# Patient Record
Sex: FEMALE | Race: OTHER | HISPANIC OR LATINO | URBAN - METROPOLITAN AREA
[De-identification: names, ages, dates, MRNs, and addresses within clinical notes are randomized per-mention and may not be internally consistent; named-entity substitution may affect disease eponyms.]

---

## 2021-04-17 ENCOUNTER — INPATIENT (INPATIENT)
Facility: HOSPITAL | Age: 36
LOS: 1 days | Discharge: HOME | DRG: 690 | End: 2021-04-19
Attending: INTERNAL MEDICINE | Admitting: INTERNAL MEDICINE
Payer: MEDICAID

## 2021-04-17 VITALS
HEIGHT: 65 IN | OXYGEN SATURATION: 97 % | DIASTOLIC BLOOD PRESSURE: 66 MMHG | TEMPERATURE: 98 F | SYSTOLIC BLOOD PRESSURE: 130 MMHG | RESPIRATION RATE: 18 BRPM | WEIGHT: 147.05 LBS | HEART RATE: 64 BPM

## 2021-04-17 DIAGNOSIS — Z98.891 HISTORY OF UTERINE SCAR FROM PREVIOUS SURGERY: Chronic | ICD-10-CM

## 2021-04-17 DIAGNOSIS — N12 TUBULO-INTERSTITIAL NEPHRITIS, NOT SPECIFIED AS ACUTE OR CHRONIC: ICD-10-CM

## 2021-04-17 DIAGNOSIS — Z98.890 OTHER SPECIFIED POSTPROCEDURAL STATES: Chronic | ICD-10-CM

## 2021-04-17 LAB
ALBUMIN SERPL ELPH-MCNC: 4.1 G/DL — SIGNIFICANT CHANGE UP (ref 3.5–5)
ALP SERPL-CCNC: 53 U/L — SIGNIFICANT CHANGE UP (ref 40–120)
ALT FLD-CCNC: 19 U/L DA — SIGNIFICANT CHANGE UP (ref 10–60)
ANION GAP SERPL CALC-SCNC: 7 MMOL/L — SIGNIFICANT CHANGE UP (ref 5–17)
APPEARANCE UR: ABNORMAL
AST SERPL-CCNC: 15 U/L — SIGNIFICANT CHANGE UP (ref 10–40)
BACTERIA # UR AUTO: ABNORMAL /HPF
BASOPHILS # BLD AUTO: 0.04 K/UL — SIGNIFICANT CHANGE UP (ref 0–0.2)
BASOPHILS NFR BLD AUTO: 0.4 % — SIGNIFICANT CHANGE UP (ref 0–2)
BILIRUB SERPL-MCNC: 2.6 MG/DL — HIGH (ref 0.2–1.2)
BILIRUB UR-MCNC: NEGATIVE — SIGNIFICANT CHANGE UP
BLD GP AB SCN SERPL QL: SIGNIFICANT CHANGE UP
BUN SERPL-MCNC: 12 MG/DL — SIGNIFICANT CHANGE UP (ref 7–18)
CALCIUM SERPL-MCNC: 8.7 MG/DL — SIGNIFICANT CHANGE UP (ref 8.4–10.5)
CHLORIDE SERPL-SCNC: 108 MMOL/L — SIGNIFICANT CHANGE UP (ref 96–108)
CO2 SERPL-SCNC: 24 MMOL/L — SIGNIFICANT CHANGE UP (ref 22–31)
COLOR SPEC: YELLOW — SIGNIFICANT CHANGE UP
CREAT SERPL-MCNC: 0.55 MG/DL — SIGNIFICANT CHANGE UP (ref 0.5–1.3)
DIFF PNL FLD: ABNORMAL
EOSINOPHIL # BLD AUTO: 0.09 K/UL — SIGNIFICANT CHANGE UP (ref 0–0.5)
EOSINOPHIL NFR BLD AUTO: 0.8 % — SIGNIFICANT CHANGE UP (ref 0–6)
EPI CELLS # UR: SIGNIFICANT CHANGE UP /HPF
GLUCOSE SERPL-MCNC: 78 MG/DL — SIGNIFICANT CHANGE UP (ref 70–99)
GLUCOSE UR QL: NEGATIVE — SIGNIFICANT CHANGE UP
HCG SERPL-ACNC: <1 MIU/ML — SIGNIFICANT CHANGE UP
HCT VFR BLD CALC: 33.7 % — LOW (ref 34.5–45)
HGB BLD-MCNC: 11.6 G/DL — SIGNIFICANT CHANGE UP (ref 11.5–15.5)
IMM GRANULOCYTES NFR BLD AUTO: 0.4 % — SIGNIFICANT CHANGE UP (ref 0–1.5)
KETONES UR-MCNC: NEGATIVE — SIGNIFICANT CHANGE UP
LACTATE SERPL-SCNC: 1.9 MMOL/L — SIGNIFICANT CHANGE UP (ref 0.7–2)
LEUKOCYTE ESTERASE UR-ACNC: ABNORMAL
LIDOCAIN IGE QN: 176 U/L — SIGNIFICANT CHANGE UP (ref 73–393)
LYMPHOCYTES # BLD AUTO: 1.57 K/UL — SIGNIFICANT CHANGE UP (ref 1–3.3)
LYMPHOCYTES # BLD AUTO: 14.6 % — SIGNIFICANT CHANGE UP (ref 13–44)
MCHC RBC-ENTMCNC: 31.4 PG — SIGNIFICANT CHANGE UP (ref 27–34)
MCHC RBC-ENTMCNC: 34.4 GM/DL — SIGNIFICANT CHANGE UP (ref 32–36)
MCV RBC AUTO: 91.3 FL — SIGNIFICANT CHANGE UP (ref 80–100)
MONOCYTES # BLD AUTO: 0.92 K/UL — HIGH (ref 0–0.9)
MONOCYTES NFR BLD AUTO: 8.6 % — SIGNIFICANT CHANGE UP (ref 2–14)
NEUTROPHILS # BLD AUTO: 8.07 K/UL — HIGH (ref 1.8–7.4)
NEUTROPHILS NFR BLD AUTO: 75.2 % — SIGNIFICANT CHANGE UP (ref 43–77)
NITRITE UR-MCNC: NEGATIVE — SIGNIFICANT CHANGE UP
NRBC # BLD: 0 /100 WBCS — SIGNIFICANT CHANGE UP (ref 0–0)
PH UR: 6 — SIGNIFICANT CHANGE UP (ref 5–8)
PLATELET # BLD AUTO: 312 K/UL — SIGNIFICANT CHANGE UP (ref 150–400)
POTASSIUM SERPL-MCNC: 4.3 MMOL/L — SIGNIFICANT CHANGE UP (ref 3.5–5.3)
POTASSIUM SERPL-SCNC: 4.3 MMOL/L — SIGNIFICANT CHANGE UP (ref 3.5–5.3)
PROT SERPL-MCNC: 8.1 G/DL — SIGNIFICANT CHANGE UP (ref 6–8.3)
PROT UR-MCNC: 15
RAPID RVP RESULT: SIGNIFICANT CHANGE UP
RBC # BLD: 3.69 M/UL — LOW (ref 3.8–5.2)
RBC # FLD: 16.1 % — HIGH (ref 10.3–14.5)
RBC CASTS # UR COMP ASSIST: ABNORMAL /HPF (ref 0–2)
SARS-COV-2 RNA SPEC QL NAA+PROBE: SIGNIFICANT CHANGE UP
SODIUM SERPL-SCNC: 139 MMOL/L — SIGNIFICANT CHANGE UP (ref 135–145)
SP GR SPEC: 1.02 — SIGNIFICANT CHANGE UP (ref 1.01–1.02)
UROBILINOGEN FLD QL: 1
WBC # BLD: 10.73 K/UL — HIGH (ref 3.8–10.5)
WBC # FLD AUTO: 10.73 K/UL — HIGH (ref 3.8–10.5)
WBC UR QL: ABNORMAL /HPF (ref 0–5)

## 2021-04-17 RX ORDER — KETOROLAC TROMETHAMINE 30 MG/ML
15 SYRINGE (ML) INJECTION ONCE
Refills: 0 | Status: DISCONTINUED | OUTPATIENT
Start: 2021-04-17 | End: 2021-04-17

## 2021-04-17 RX ORDER — MEROPENEM 1 G/30ML
1000 INJECTION INTRAVENOUS EVERY 8 HOURS
Refills: 0 | Status: DISCONTINUED | OUTPATIENT
Start: 2021-04-17 | End: 2021-04-18

## 2021-04-17 RX ORDER — IOHEXOL 300 MG/ML
30 INJECTION, SOLUTION INTRAVENOUS ONCE
Refills: 0 | Status: COMPLETED | OUTPATIENT
Start: 2021-04-17 | End: 2021-04-17

## 2021-04-17 RX ORDER — SODIUM CHLORIDE 9 MG/ML
1000 INJECTION INTRAMUSCULAR; INTRAVENOUS; SUBCUTANEOUS ONCE
Refills: 0 | Status: COMPLETED | OUTPATIENT
Start: 2021-04-17 | End: 2021-04-17

## 2021-04-17 RX ORDER — CEFTRIAXONE 500 MG/1
1000 INJECTION, POWDER, FOR SOLUTION INTRAMUSCULAR; INTRAVENOUS ONCE
Refills: 0 | Status: COMPLETED | OUTPATIENT
Start: 2021-04-17 | End: 2021-04-17

## 2021-04-17 RX ADMIN — IOHEXOL 30 MILLILITER(S): 300 INJECTION, SOLUTION INTRAVENOUS at 15:54

## 2021-04-17 RX ADMIN — CEFTRIAXONE 100 MILLIGRAM(S): 500 INJECTION, POWDER, FOR SOLUTION INTRAMUSCULAR; INTRAVENOUS at 20:55

## 2021-04-17 RX ADMIN — Medication 15 MILLIGRAM(S): at 20:47

## 2021-04-17 RX ADMIN — Medication 15 MILLIGRAM(S): at 21:02

## 2021-04-17 RX ADMIN — SODIUM CHLORIDE 1000 MILLILITER(S): 9 INJECTION INTRAMUSCULAR; INTRAVENOUS; SUBCUTANEOUS at 17:00

## 2021-04-17 RX ADMIN — SODIUM CHLORIDE 1000 MILLILITER(S): 9 INJECTION INTRAMUSCULAR; INTRAVENOUS; SUBCUTANEOUS at 15:54

## 2021-04-17 NOTE — ED PROVIDER NOTE - ATTENDING CONTRIBUTION TO CARE
Patient presenting with flank pain  was diagnosed with uti and had outpatient iv abx x 3 days  + CVAT b/l  mild left sided abd discomfort  will obtain lab, ct, assess for pyelo, ed obs and reassess

## 2021-04-17 NOTE — H&P ADULT - ASSESSMENT
37yo F with PSH of sleeve gastrecomy with 9 revisions and no PMH presents with the complain of severe back pain for the past week. Admitted for pyelonephritis

## 2021-04-17 NOTE — H&P ADULT - PROBLEM SELECTOR PLAN 1
-Pt coming in with back pain with left CVA tenderness  -WBC >10K, no fever or tachycardia  -CT Abdomen/Pelvis with PO and IV contrast identified a right interpole cyst. Left-sided striated nephrogram and circumferential wall thickening.  -SIRS Criteria= 0 + likely source of infection (UTI)  -qSOFA= 1 Not High Risk  -UA is mildly positive  -Will continue with IVF hydration  -Tylenol PRN for fever  -Will start on Rocephin  -Follow urine and blood cultures though pt has been on abx out patient

## 2021-04-17 NOTE — ED PROVIDER NOTE - PROGRESS NOTE DETAILS
Patient complaining of mild discomfort, toradol ordered. Results discussed with patient.  Patient offered admission due to pyelonephritis in the setting of failure of outpatient abx and is in agreement to admission.  Dr. White accepting of admission.

## 2021-04-17 NOTE — H&P ADULT - HISTORY OF PRESENT ILLNESS
35yo F with PSH of sleeve gastrecomy with 9 revisions and no PMH presents with the complain of severe back pain for the past week. Pt ststes she went to her doctor for the back pain 3 days ago and was told she had a kidney infection and given 1 dose of an IV abx and sent home on oral abx (cefdinir). Pt took 2 of these tablets however her pain did not get any better. It is located in her left flank and radiates into her entire back, 9/10 in intensity and associated with chills. She endorses feeling fatigued, having dark urine but denies dysuria, burning micturition, incontinence, nausea, vomiting or diarrhea.     Ed course: CT abd: Urinary bladder wall thickening and a left-sided striated nephrogram, suspicious for cystitis-pyelonephritis. S/p ceftriaxone

## 2021-04-17 NOTE — H&P ADULT - PROBLEM SELECTOR PLAN 2
IMPROVE VTE Individual Risk Assessment  RISK                                                                Points  [  ] Previous VTE                                                  3  [  ] Thrombophilia                                               2  [  ] Lower limb paralysis                                      2        (unable to hold up >15 seconds)    [  ] Current Cancer                                              2         (within 6 months)  [  ] Immobilization > 24 hrs                                1  [  ] ICU/CCU stay > 24 hours                              1  [  ] Age > 60                                                      1  IMPROVE VTE Score 0, -- for DVT proph    No DVT ppx required

## 2021-04-17 NOTE — H&P ADULT - NSICDXFAMILYHX_GEN_ALL_CORE_FT
FAMILY HISTORY:  Father  Still living? Unknown  FH: diabetes mellitus, Age at diagnosis: Age Unknown  FH: hypertension, Age at diagnosis: Age Unknown    Mother  Still living? Unknown  FH: diabetes mellitus, Age at diagnosis: Age Unknown  FH: hypertension, Age at diagnosis: Age Unknown

## 2021-04-17 NOTE — ED ADULT NURSE NOTE - NSIMPLEMENTINTERV_GEN_ALL_ED
Implemented All Universal Safety Interventions:  Phil Campbell to call system. Call bell, personal items and telephone within reach. Instruct patient to call for assistance. Room bathroom lighting operational. Non-slip footwear when patient is off stretcher. Physically safe environment: no spills, clutter or unnecessary equipment. Stretcher in lowest position, wheels locked, appropriate side rails in place.

## 2021-04-17 NOTE — ED PROVIDER NOTE - CLINICAL SUMMARY MEDICAL DECISION MAKING FREE TEXT BOX
36 year old female history of gastric sleeve (2018, 9 revisions per patient), anemia, c-sections, abdominoplasty presents for subjective chills, fevers, malaise, b/l flank pain and dizziness x1 week in setting of ? failure of outpatient antibiotics for pyelonephritis.  Will check labs, urine, CTAP, IV hydration.

## 2021-04-17 NOTE — ED PROVIDER NOTE - OBJECTIVE STATEMENT
36 year old female history of gastric sleeve (2018, 9 revisions per patient), anemia, c-sections, abdominoplasty presents for subjective chills, fevers, malaise, b/l flank pain and dizziness x1 week.  Patient reports that she was evaluated for symptoms 3 days ago, was provided meclizine, cefdinir, IBU, however symptoms persist so patient went to urgent care today who referred patient to ED for further workup.  Patient also reports that she has been going to an outpatient center and receiving an unknown IV antibiotics for a "kidney infection" in addition to oral cefdinir, but symptoms persist.  Patient also endorsing left sided abd pain.  Patient states she often feels a room spinning sensation when walking.  Patient denies any nausea or vomiting and denies any measured fevers. 36 year old female history of gastric (2018, 9 revisions per patient), anemia, c-sections, abdominoplasty presents for subjective chills, fevers, malaise, b/l flank pain and dizziness x1 week.  Patient reports that she was evaluated for symptoms 3 days ago, was provided meclizine, cefdinir, IBU, however symptoms persist so patient went to urgent care today who referred patient to ED for further workup.  Patient also reports that she has been going to an outpatient center and receiving an unknown IV antibiotics for a "kidney infection" in addition to oral cefdinir, but symptoms persist.  Patient also endorsing left sided abd pain.  Patient states she often feels a room spinning sensation when walking.  Patient denies any nausea or vomiting and denies any measured fevers.

## 2021-04-17 NOTE — H&P ADULT - NSHPPHYSICALEXAM_GEN_ALL_CORE
LOS: 1d    VITALS:   T(C): 36.7 (04-17-21 @ 23:49), Max: 37 (04-17-21 @ 20:43)  HR: 81 (04-17-21 @ 23:49) (64 - 81)  BP: 106/57 (04-17-21 @ 23:49) (106/57 - 130/66)  RR: 18 (04-17-21 @ 23:49) (18 - 18)  SpO2: 100% (04-17-21 @ 23:49) (97% - 100%)    GENERAL: NAD, lying in bed comfortably  HEAD:  Atraumatic, Normocephalic  EYES: EOMI, PERRLA, conjunctiva and sclera clear  ENT: Moist mucous membranes  NECK: Supple, No JVD  CHEST/LUNG: Clear to auscultation bilaterally; No rales, rhonchi, wheezing, or rubs. Unlabored respirations  HEART: Regular rate and rhythm; No murmurs, rubs, or gallops  ABDOMEN: Left CVA tenderness  EXTREMITIES:  2+ Peripheral Pulses, brisk capillary refill. No clubbing, cyanosis, or edema  NERVOUS SYSTEM:  A&Ox3, no focal deficits   SKIN: No rashes or lesions

## 2021-04-17 NOTE — H&P ADULT - NSHPSOCIALHISTORY_GEN_ALL_CORE
Lives at home with children and mother. Drinks socially and does not smoke cigarettes or use recreational drugs

## 2021-04-17 NOTE — ED ADULT NURSE NOTE - IN THE PAST 12 MONTHS HAVE YOU USED DRUGS OTHER THAN THOSE REQUIRED FOR MEDICAL REASON?
No Topical Retinoid counseling:  Patient advised to apply a pea-sized amount only at bedtime and wait 30 minutes after washing their face before applying.  If too drying, patient may add a non-comedogenic moisturizer. The patient verbalized understanding of the proper use and possible adverse effects of retinoids.  All of the patient's questions and concerns were addressed.

## 2021-04-17 NOTE — H&P ADULT - NSHPREVIEWOFSYSTEMS_GEN_ALL_CORE
REVIEW OF SYSTEMS:    CONSTITUTIONAL: chills. no fevers   EYES/ENT: vertigo, No visual changes, throat pain   NECK: No pain or stiffness  RESPIRATORY: No cough, wheezing, hemoptysis; No shortness of breath  CARDIOVASCULAR: No chest pain or palpitations  GASTROINTESTINAL: No abdominal or epigastric pain. No nausea, vomiting, or hematemesis; No diarrhea or constipation. No melena or hematochezia.  GENITOURINARY: No dysuria, frequency or hematuria  NEUROLOGICAL: No numbness or weakness  SKIN: No itching, rashes

## 2021-04-17 NOTE — H&P ADULT - ATTENDING COMMENTS
Patient is a 36 year old female with a PMH of Gastric Sleeve in 2018 (s/p 9 revisions per patient), Chronic Anemia who was BIBEMS due to flank pain.  ( ID: [id number])  Patient states that beginning approximately 3 days prior to current presentation, she began to experience left flank pain for which she went to an outpatient PCP who administered an IV antibiotic (the name of which she does not know) and was sent home with Cefdinir 300mg PO (of which patient endorses taking only 2 pills).  However, patient was then instructed to follow-up at another clinic for "imaging", the results of which she does not know, though she was informed that she would need to go to the ED for further evaluation.    At time of examination in the ED, patient endorses right flank pain (not left).  However, patient denies any headache, dizziness, chest pain, palpitations, shortness of breath, nausea/vomiting/diarrhea.    T(C): 37 (04-17-21 @ 20:43), Max: 37 (04-17-21 @ 20:43)  T(F): 98.6 (04-17-21 @ 20:43), Max: 98.6 (04-17-21 @ 20:43)  HR: 67 (04-17-21 @ 20:43) (64 - 67)  BP: 108/62 (04-17-21 @ 20:43) (108/62 - 130/66)  RR: 18 (04-17-21 @ 20:43) (18 - 18)  SpO2: 100% (04-17-21 @ 20:43) (97% - 100%)  Wt(kg): --    P/E: As above MAR    A/P:    Acute Complicated UTI:  -CT Abdomen/Pelvis with PO and IV contrast identified a right interpole cyst. Left-sided striated nephrogram and circumferential wall thickening.  -SIRS Criteria= 0 + viktor source of infection (UTI)  -UA is grossly negative though patient has been treated with IV and PO antibiotics over the past several days  -Will send ESR, CRP, Procalcitonin  -Will continue with IVF hydration  -Tylenol PRN for fever  -Will continue patient on Rocephin, for now  -Patient may need to be seen by Urology, though will hold for now    Hyperbilirubinemia:  -Will send Bilirubin Total, Direct and Indirect  -If no demonstrated improvement, can consider obtaining Ultrasound of RUQ    GI/DVT PPx:  -Heparin  -Pepcid Patient is a 36 year old female with a PMH of Gastric Sleeve in 2018 (s/p 9 revisions per patient), Chronic Anemia who was BIBEMS due to flank pain.  ( ID: [id number])  Patient states that beginning approximately 3 days prior to current presentation, she began to experience left flank pain for which she went to an outpatient PCP who administered an IV antibiotic (the name of which she does not know) and was sent home with Cefdinir 300mg PO (of which patient endorses taking only 2 pills).  However, patient was then instructed to follow-up at another clinic for "imaging", the results of which she does not know, though she was informed that she would need to go to the ED for further evaluation.    At time of examination in the ED, patient endorses right flank pain (not left).  However, patient denies any headache, dizziness, chest pain, palpitations, shortness of breath, nausea/vomiting/diarrhea.    T(C): 37 (04-17-21 @ 20:43), Max: 37 (04-17-21 @ 20:43)  T(F): 98.6 (04-17-21 @ 20:43), Max: 98.6 (04-17-21 @ 20:43)  HR: 67 (04-17-21 @ 20:43) (64 - 67)  BP: 108/62 (04-17-21 @ 20:43) (108/62 - 130/66)  RR: 18 (04-17-21 @ 20:43) (18 - 18)  SpO2: 100% (04-17-21 @ 20:43) (97% - 100%)  Wt(kg): --    P/E: As above MAR    A/P:    Acute Complicated UTI:  -CT Abdomen/Pelvis with PO and IV contrast identified a right interpole cyst. Left-sided striated nephrogram and circumferential wall thickening.  -SIRS Criteria= 0 + likely source of infection (UTI)  -UA is grossly negative though patient has been treated with IV and PO antibiotics over the past several days  -Will send ESR, CRP, Procalcitonin  -Will continue with IVF hydration  -Tylenol PRN for fever  -Will continue patient on Rocephin, for now  -Patient may need to be seen by Urology, though will hold for now    Hyperbilirubinemia:  -Will send Bilirubin Total, Direct and Indirect  -If no demonstrated improvement, can consider obtaining Ultrasound of RUQ    GI/DVT PPx:  -Heparin  -Pepcid

## 2021-04-18 DIAGNOSIS — I95.9 HYPOTENSION, UNSPECIFIED: ICD-10-CM

## 2021-04-18 DIAGNOSIS — Z29.9 ENCOUNTER FOR PROPHYLACTIC MEASURES, UNSPECIFIED: ICD-10-CM

## 2021-04-18 DIAGNOSIS — N12 TUBULO-INTERSTITIAL NEPHRITIS, NOT SPECIFIED AS ACUTE OR CHRONIC: ICD-10-CM

## 2021-04-18 LAB
24R-OH-CALCIDIOL SERPL-MCNC: 14.5 NG/ML — LOW (ref 30–80)
A1C WITH ESTIMATED AVERAGE GLUCOSE RESULT: 4 % — SIGNIFICANT CHANGE UP (ref 4–5.6)
ANION GAP SERPL CALC-SCNC: 6 MMOL/L — SIGNIFICANT CHANGE UP (ref 5–17)
BUN SERPL-MCNC: 13 MG/DL — SIGNIFICANT CHANGE UP (ref 7–18)
CALCIUM SERPL-MCNC: 8 MG/DL — LOW (ref 8.4–10.5)
CHLORIDE SERPL-SCNC: 109 MMOL/L — HIGH (ref 96–108)
CHOLEST SERPL-MCNC: 113 MG/DL — SIGNIFICANT CHANGE UP
CO2 SERPL-SCNC: 25 MMOL/L — SIGNIFICANT CHANGE UP (ref 22–31)
COVID-19 SPIKE DOMAIN AB INTERP: POSITIVE
COVID-19 SPIKE DOMAIN ANTIBODY RESULT: >250 U/ML — HIGH
CREAT SERPL-MCNC: 0.37 MG/DL — LOW (ref 0.5–1.3)
CRP SERPL-MCNC: 32 MG/L — HIGH
CULTURE RESULTS: NO GROWTH — SIGNIFICANT CHANGE UP
ERYTHROCYTE [SEDIMENTATION RATE] IN BLOOD: 34 MM/HR — HIGH (ref 0–15)
ESTIMATED AVERAGE GLUCOSE: 68 MG/DL — SIGNIFICANT CHANGE UP (ref 68–114)
FOLATE SERPL-MCNC: 10 NG/ML — SIGNIFICANT CHANGE UP
GLUCOSE SERPL-MCNC: 81 MG/DL — SIGNIFICANT CHANGE UP (ref 70–99)
HCT VFR BLD CALC: 28.5 % — LOW (ref 34.5–45)
HDLC SERPL-MCNC: 39 MG/DL — LOW
HGB BLD-MCNC: 10 G/DL — LOW (ref 11.5–15.5)
LIPID PNL WITH DIRECT LDL SERPL: 55 MG/DL — SIGNIFICANT CHANGE UP
MAGNESIUM SERPL-MCNC: 2.3 MG/DL — SIGNIFICANT CHANGE UP (ref 1.6–2.6)
MCHC RBC-ENTMCNC: 31.6 PG — SIGNIFICANT CHANGE UP (ref 27–34)
MCHC RBC-ENTMCNC: 35.1 GM/DL — SIGNIFICANT CHANGE UP (ref 32–36)
MCV RBC AUTO: 90.2 FL — SIGNIFICANT CHANGE UP (ref 80–100)
NON HDL CHOLESTEROL: 74 MG/DL — SIGNIFICANT CHANGE UP
NRBC # BLD: 0 /100 WBCS — SIGNIFICANT CHANGE UP (ref 0–0)
PHOSPHATE SERPL-MCNC: 4 MG/DL — SIGNIFICANT CHANGE UP (ref 2.5–4.5)
PLATELET # BLD AUTO: 275 K/UL — SIGNIFICANT CHANGE UP (ref 150–400)
POTASSIUM SERPL-MCNC: 3.9 MMOL/L — SIGNIFICANT CHANGE UP (ref 3.5–5.3)
POTASSIUM SERPL-SCNC: 3.9 MMOL/L — SIGNIFICANT CHANGE UP (ref 3.5–5.3)
RBC # BLD: 3.16 M/UL — LOW (ref 3.8–5.2)
RBC # FLD: 16.3 % — HIGH (ref 10.3–14.5)
SARS-COV-2 IGG+IGM SERPL QL IA: >250 U/ML — HIGH
SARS-COV-2 IGG+IGM SERPL QL IA: POSITIVE
SODIUM SERPL-SCNC: 140 MMOL/L — SIGNIFICANT CHANGE UP (ref 135–145)
SPECIMEN SOURCE: SIGNIFICANT CHANGE UP
TRIGL SERPL-MCNC: 95 MG/DL — SIGNIFICANT CHANGE UP
VIT B12 SERPL-MCNC: 320 PG/ML — SIGNIFICANT CHANGE UP (ref 232–1245)
WBC # BLD: 6.86 K/UL — SIGNIFICANT CHANGE UP (ref 3.8–10.5)
WBC # FLD AUTO: 6.86 K/UL — SIGNIFICANT CHANGE UP (ref 3.8–10.5)

## 2021-04-18 RX ORDER — SODIUM CHLORIDE 9 MG/ML
1000 INJECTION INTRAMUSCULAR; INTRAVENOUS; SUBCUTANEOUS
Refills: 0 | Status: DISCONTINUED | OUTPATIENT
Start: 2021-04-18 | End: 2021-04-19

## 2021-04-18 RX ORDER — ACETAMINOPHEN 500 MG
650 TABLET ORAL EVERY 6 HOURS
Refills: 0 | Status: DISCONTINUED | OUTPATIENT
Start: 2021-04-18 | End: 2021-04-19

## 2021-04-18 RX ORDER — SODIUM CHLORIDE 9 MG/ML
1000 INJECTION INTRAMUSCULAR; INTRAVENOUS; SUBCUTANEOUS
Refills: 0 | Status: DISCONTINUED | OUTPATIENT
Start: 2021-04-18 | End: 2021-04-18

## 2021-04-18 RX ORDER — CEFTRIAXONE 500 MG/1
1000 INJECTION, POWDER, FOR SOLUTION INTRAMUSCULAR; INTRAVENOUS EVERY 24 HOURS
Refills: 0 | Status: DISCONTINUED | OUTPATIENT
Start: 2021-04-18 | End: 2021-04-19

## 2021-04-18 RX ADMIN — CEFTRIAXONE 100 MILLIGRAM(S): 500 INJECTION, POWDER, FOR SOLUTION INTRAMUSCULAR; INTRAVENOUS at 03:36

## 2021-04-18 RX ADMIN — SODIUM CHLORIDE 100 MILLILITER(S): 9 INJECTION INTRAMUSCULAR; INTRAVENOUS; SUBCUTANEOUS at 10:02

## 2021-04-18 RX ADMIN — Medication 650 MILLIGRAM(S): at 15:59

## 2021-04-18 RX ADMIN — Medication 650 MILLIGRAM(S): at 16:30

## 2021-04-18 RX ADMIN — SODIUM CHLORIDE 70 MILLILITER(S): 9 INJECTION INTRAMUSCULAR; INTRAVENOUS; SUBCUTANEOUS at 03:37

## 2021-04-18 NOTE — DISCHARGE NOTE PROVIDER - NSDCCPCAREPLAN_GEN_ALL_CORE_FT
PRINCIPAL DISCHARGE DIAGNOSIS  Diagnosis: Pyelonephritis  Assessment and Plan of Treatment: You came to the hospital with  back pain. On examination, you had left  CVA tenderness. You had no fevers. Your white count was elevated. Your CT Abdomen/Pelvis showed a right interpole cyst and left-sided striated nephrogram and circumferential wall thickening indicating a kidney infection (pyelonephritis)/ You were treated with intravenous antibiotics Rocephin. You will be discharged with **** antibiotics which you must take for *** days. You were also treated with intravenous fluids for low blood pressures and Tylenol for fever. Kindly follow up with your PCP in two weeks after discharge       PRINCIPAL DISCHARGE DIAGNOSIS  Diagnosis: Pyelonephritis  Assessment and Plan of Treatment: You came to the hospital with  back pain. On examination, you had left  CVA tenderness. You had no fevers. Your white count was elevated. Your CT Abdomen/Pelvis showed a right interpole cyst and left-sided striated nephrogram and circumferential wall thickening indicating a kidney infection (pyelonephritis)/ You were treated with intravenous antibiotics Rocephin. You will be discharged with ceftin antibiotics which you must take for 5 days. You were also treated with intravenous fluids for low blood pressures and Tylenol for fever. Kindly follow up with your PCP in two weeks after discharge      SECONDARY DISCHARGE DIAGNOSES  Diagnosis: Malpositioned IUD  Assessment and Plan of Treatment: On     PRINCIPAL DISCHARGE DIAGNOSIS  Diagnosis: Pyelonephritis  Assessment and Plan of Treatment: You came to the hospital with  back pain. On examination, you had left  CVA tenderness. You had no fevers. Your white count was elevated. Your CT Abdomen/Pelvis showed a right interpole cyst and left-sided striated nephrogram and circumferential wall thickening indicating a kidney infection (pyelonephritis)/ You were treated with intravenous antibiotics Rocephin. You will be discharged with ceftin antibiotics which you must take for 5 days. You were also treated with intravenous fluids for low blood pressures and Tylenol for fever. Kindly follow up with your PCP in two weeks after discharge      SECONDARY DISCHARGE DIAGNOSES  Diagnosis: Malpositioned IUD  Assessment and Plan of Treatment: On CT abdomen and pelvis showed malpositioned IUD, Gyn consulted and recommended o/p f/u.     PRINCIPAL DISCHARGE DIAGNOSIS  Diagnosis: Pyelonephritis  Assessment and Plan of Treatment: You came to the hospital with  back pain. On examination, you had left  CVA tenderness. You had no fevers. Your white count was elevated. Your CT Abdomen/Pelvis showed a right interpole cyst and left-sided striated nephrogram and circumferential wall thickening indicating a kidney infection (pyelonephritis)/ You were treated with intravenous antibiotics Rocephin. You will be discharged with ceftin antibiotics which you must take for 5 days. You were also treated with intravenous fluids for low blood pressures and Tylenol for fever. Kindly follow up with your PCP in two weeks after discharge      SECONDARY DISCHARGE DIAGNOSES  Diagnosis: Malpositioned IUD  Assessment and Plan of Treatment: On CT abdomen and pelvis showed malpositioned IUD, Gynecology consulted and recommended outpatient follow up with Dr. Karen Gómez within two weeks of discharge    Diagnosis: Vitamin D deficiency  Assessment and Plan of Treatment: You had low Vitamin D levels. We treated you with Vitamin D 1000U daily, Kindly continue the same on discharge     PRINCIPAL DISCHARGE DIAGNOSIS  Diagnosis: Pyelonephritis  Assessment and Plan of Treatment: You came to the hospital with  back pain. On examination, you had left  CVA tenderness. You had no fevers. Your white count was elevated. Your CT Abdomen/Pelvis showed a right interpole cyst and left-sided striated nephrogram and circumferential wall thickening indicating a kidney infection (pyelonephritis)/ You were treated with intravenous antibiotics Rocephin. You will be discharged with ciprofloxacin antibiotics which you must take for 7 days. You were also treated with intravenous fluids for low blood pressures and Tylenol for fever. Kindly follow up with your PCP in two weeks after discharge      SECONDARY DISCHARGE DIAGNOSES  Diagnosis: Malpositioned IUD  Assessment and Plan of Treatment: On CT abdomen and pelvis showed malpositioned IUD, Gynecology consulted and recommended outpatient follow up with Dr. Karen Gómez within two weeks of discharge    Diagnosis: Vitamin D deficiency  Assessment and Plan of Treatment: You had low Vitamin D levels. We treated you with Vitamin D 1000U daily, Kindly continue the same on discharge

## 2021-04-18 NOTE — PROGRESS NOTE ADULT - PROBLEM SELECTOR PLAN 2
IMPROVE VTE Individual Risk Assessment  RISK                                                                Points  [  ] Previous VTE                                                  3  [  ] Thrombophilia                                               2  [  ] Lower limb paralysis                                      2        (unable to hold up >15 seconds)    [  ] Current Cancer                                              2         (within 6 months)  [  ] Immobilization > 24 hrs                                1  [  ] ICU/CCU stay > 24 hours                              1  [  ] Age > 60                                                      1  IMPROVE VTE Score 0, -- for DVT proph    No DVT ppx required Pt has been having low blood pressures (likely pt's baseline)  on IVF  will monitor BPs

## 2021-04-18 NOTE — PROGRESS NOTE ADULT - SUBJECTIVE AND OBJECTIVE BOX
PGY-1 Progress Note discussed with attending    PAGER #: [[phone]] TILL 5:00 PM  PLEASE CONTACT ON CALL TEAM:  - On Call Team (Please refer to Denise) FROM 5:00 PM - 8:30PM  - Nightfloat Team FROM 8:30 -7:30 AM    INTERVAL HPI/OVERNIGHT EVENTS:   Pt afebrile last night. Had no complaints this morning.     REVIEW OF SYSTEMS:  CONSTITUTIONAL: No fever, weight loss, or fatigue  RESPIRATORY: No cough, wheezing, chills or hemoptysis; No shortness of breath  CARDIOVASCULAR: No chest pain, palpitations, dizziness, or leg swelling  GASTROINTESTINAL: No abdominal pain. No nausea, vomiting, or hematemesis; No diarrhea or constipation. No melena or hematochezia.  GENITOURINARY: No dysuria or hematuria, urinary frequency  NEUROLOGICAL: No headaches, memory loss, loss of strength, numbness, or tremors  SKIN: No itching, burning, rashes, or lesions     Vital Signs Last 24 Hrs  T(C): 36.7 (18 Apr 2021 05:12), Max: 37 (17 Apr 2021 20:43)  T(F): 98 (18 Apr 2021 05:12), Max: 98.6 (17 Apr 2021 20:43)  HR: 70 (18 Apr 2021 05:12) (64 - 81)  BP: 88/46 (18 Apr 2021 05:12) (88/46 - 130/66)  BP(mean): --  RR: 18 (18 Apr 2021 05:12) (18 - 18)  SpO2: 99% (18 Apr 2021 05:12) (97% - 100%)    PHYSICAL EXAMINATION:  GENERAL: NAD, well built  HEAD:  Atraumatic, Normocephalic  EYES:  conjunctiva and sclera clear  NECK: Supple, No JVD, Normal thyroid  CHEST/LUNG: Clear to auscultation. Clear to percussion bilaterally; No rales, rhonchi, wheezing, or rubs  HEART: Regular rate and rhythm; No murmurs, rubs, or gallops  ABDOMEN: Soft, Nontender, Nondistended; Bowel sounds present  NERVOUS SYSTEM:  Alert & Oriented X3,    EXTREMITIES:  2+ Peripheral Pulses, No clubbing, cyanosis, or edema  SKIN: warm dry                          10.0   6.86  )-----------( 275      ( 18 Apr 2021 06:45 )             28.5     04-18    140  |  109<H>  |  13  ----------------------------<  81  3.9   |  25  |  0.37<L>    Ca    8.0<L>      18 Apr 2021 06:45  Phos  4.0     04-18  Mg     2.3     04-18    TPro  8.1  /  Alb  4.1  /  TBili  2.6<H>  /  DBili  x   /  AST  15  /  ALT  19  /  AlkPhos  53  04-17    LIVER FUNCTIONS - ( 17 Apr 2021 16:06 )  Alb: 4.1 g/dL / Pro: 8.1 g/dL / ALK PHOS: 53 U/L / ALT: 19 U/L DA / AST: 15 U/L / GGT: x               CAPILLARY BLOOD GLUCOSE      RADIOLOGY & ADDITIONAL TESTS:       PGY-1 Progress Note discussed with attending    PAGER #: [[phone]] TILL 5:00 PM  PLEASE CONTACT ON CALL TEAM:  - On Call Team (Please refer to Denise) FROM 5:00 PM - 8:30PM  - Nightfloat Team FROM 8:30 -7:30 AM    INTERVAL HPI/OVERNIGHT EVENTS:   Pt afebrile last night. Had no complaints this morning. Blood pressures are soft, on IVF, will monitor BPs.    REVIEW OF SYSTEMS:  CONSTITUTIONAL: No fever, weight loss, or fatigue  RESPIRATORY: No cough, wheezing, chills or hemoptysis; No shortness of breath  CARDIOVASCULAR: No chest pain, palpitations, dizziness, or leg swelling  GASTROINTESTINAL: No abdominal pain. No nausea, vomiting, or hematemesis; No diarrhea or constipation. No melena or hematochezia.  GENITOURINARY: No dysuria or hematuria, urinary frequency  NEUROLOGICAL: No headaches, memory loss, loss of strength, numbness, or tremors  SKIN: No itching, burning, rashes, or lesions     Vital Signs Last 24 Hrs  T(C): 36.7 (18 Apr 2021 05:12), Max: 37 (17 Apr 2021 20:43)  T(F): 98 (18 Apr 2021 05:12), Max: 98.6 (17 Apr 2021 20:43)  HR: 70 (18 Apr 2021 05:12) (64 - 81)  BP: 88/46 (18 Apr 2021 05:12) (88/46 - 130/66)  BP(mean): --  RR: 18 (18 Apr 2021 05:12) (18 - 18)  SpO2: 99% (18 Apr 2021 05:12) (97% - 100%)    PHYSICAL EXAMINATION:  GENERAL: NAD, well built  HEAD:  Atraumatic, Normocephalic  EYES:  conjunctiva and sclera clear  NECK: Supple, No JVD, Normal thyroid  CHEST/LUNG: Clear to auscultation. Clear to percussion bilaterally; No rales, rhonchi, wheezing, or rubs  HEART: Regular rate and rhythm; No murmurs, rubs, or gallops  ABDOMEN: Soft, Nontender, Nondistended; Bowel sounds present  NERVOUS SYSTEM:  Alert & Oriented X3,    EXTREMITIES:  2+ Peripheral Pulses, No clubbing, cyanosis, or edema  SKIN: warm dry                          10.0   6.86  )-----------( 275      ( 18 Apr 2021 06:45 )             28.5     04-18    140  |  109<H>  |  13  ----------------------------<  81  3.9   |  25  |  0.37<L>    Ca    8.0<L>      18 Apr 2021 06:45  Phos  4.0     04-18  Mg     2.3     04-18    TPro  8.1  /  Alb  4.1  /  TBili  2.6<H>  /  DBili  x   /  AST  15  /  ALT  19  /  AlkPhos  53  04-17    LIVER FUNCTIONS - ( 17 Apr 2021 16:06 )  Alb: 4.1 g/dL / Pro: 8.1 g/dL / ALK PHOS: 53 U/L / ALT: 19 U/L DA / AST: 15 U/L / GGT: x               CAPILLARY BLOOD GLUCOSE      RADIOLOGY & ADDITIONAL TESTS:       PGY-1 Progress Note discussed with attending    PAGER #: [[phone]] TILL 5:00 PM  PLEASE CONTACT ON CALL TEAM:  - On Call Team (Please refer to Denise) FROM 5:00 PM - 8:30PM  - Nightfloat Team FROM 8:30 -7:30 AM    INTERVAL HPI/OVERNIGHT EVENTS:   Pt afebrile last night. Still has back pain in the left side this morning. Blood pressures are soft, on IVF, will monitor BPs.    REVIEW OF SYSTEMS:  CONSTITUTIONAL: No fever, weight loss, or fatigue  RESPIRATORY: No cough, wheezing, chills or hemoptysis; No shortness of breath  CARDIOVASCULAR: No chest pain, palpitations, dizziness, or leg swelling  GASTROINTESTINAL: No abdominal pain. No nausea, vomiting, or hematemesis; No diarrhea or constipation. No melena or hematochezia.  GENITOURINARY: No dysuria or hematuria, urinary frequency  NEUROLOGICAL: No headaches, memory loss, loss of strength, numbness, or tremors  SKIN: No itching, burning, rashes, or lesions     Vital Signs Last 24 Hrs  T(C): 36.7 (18 Apr 2021 05:12), Max: 37 (17 Apr 2021 20:43)  T(F): 98 (18 Apr 2021 05:12), Max: 98.6 (17 Apr 2021 20:43)  HR: 70 (18 Apr 2021 05:12) (64 - 81)  BP: 88/46 (18 Apr 2021 05:12) (88/46 - 130/66)  BP(mean): --  RR: 18 (18 Apr 2021 05:12) (18 - 18)  SpO2: 99% (18 Apr 2021 05:12) (97% - 100%)    PHYSICAL EXAMINATION:  GENERAL: NAD, well built  HEAD:  Atraumatic, Normocephalic  EYES:  conjunctiva and sclera clear  NECK: Supple, No JVD, Normal thyroid  CHEST/LUNG: Clear to auscultation. Clear to percussion bilaterally; No rales, rhonchi, wheezing, or rubs  HEART: Regular rate and rhythm; No murmurs, rubs, or gallops  ABDOMEN: Soft, Nontender, Nondistended; Bowel sounds present  NERVOUS SYSTEM:  Alert & Oriented X3,    EXTREMITIES:  2+ Peripheral Pulses, No clubbing, cyanosis, or edema  SKIN: warm dry                          10.0   6.86  )-----------( 275      ( 18 Apr 2021 06:45 )             28.5     04-18    140  |  109<H>  |  13  ----------------------------<  81  3.9   |  25  |  0.37<L>    Ca    8.0<L>      18 Apr 2021 06:45  Phos  4.0     04-18  Mg     2.3     04-18    TPro  8.1  /  Alb  4.1  /  TBili  2.6<H>  /  DBili  x   /  AST  15  /  ALT  19  /  AlkPhos  53  04-17    LIVER FUNCTIONS - ( 17 Apr 2021 16:06 )  Alb: 4.1 g/dL / Pro: 8.1 g/dL / ALK PHOS: 53 U/L / ALT: 19 U/L DA / AST: 15 U/L / GGT: x               CAPILLARY BLOOD GLUCOSE      RADIOLOGY & ADDITIONAL TESTS:

## 2021-04-18 NOTE — DISCHARGE NOTE PROVIDER - CARE PROVIDER_API CALL
Karen Gómez)  Obstetrics and Gynecology  [address] [place] [address]  Phone: [phone]  Fax: [phone]  Follow Up Time: 2 weeks

## 2021-04-18 NOTE — DISCHARGE NOTE PROVIDER - HOSPITAL COURSE
35yo F with PSH of sleeve gastrectomy with 9 revisions and no PMH presents with the complain of severe back pain for the past week. Admitted for pyelonephritis    Pt came to the hospital with back pain and left CVA tenderness. WBC >10K, no fever or tachycardia, CT Abdomen/Pelvis with PO and IV contrast identified a right interpole cyst. Left-sided striated nephrogram and circumferential wall thickening. SIRS Criteria= 0, qSOFA= 1 Not High Risk. UA is mildly positive. Pt diagnosed with pyelonephritis. Treated with IVF hydration, Tylenol PRN for fever, Rocephin.   Pt had low blood pressures, was given IVF, improved.    Patient is stable for discharge per attending and is advised to follow up with PCP as outpatient  Please refer to patient's complete medical chart with documents for a full hospital course, for this is only a brief summary.   35yo F with PSH of sleeve gastrectomy with 9 revisions and no PMH presents with the complain of severe back pain for the past week. Admitted for pyelonephritis    Pt came to the hospital with back pain and left CVA tenderness. WBC >10K, no fever or tachycardia, CT Abdomen/Pelvis with PO and IV contrast identified a right interpole cyst. Left-sided striated nephrogram and circumferential wall thickening. SIRS Criteria= 0, qSOFA= 1 Not High Risk. UA is mildly positive. Pt diagnosed with pyelonephritis. Treated with IVF hydration, Tylenol PRN for fever, Rocephin.   On CTAP showed malpositioned IUD, Gyn consulted and recommended o/p f/u.  Pt had low blood pressures, was given IVF, improved.    Patient is stable for discharge per attending and is advised to follow up with PCP as outpatient  Please refer to patient's complete medical chart with documents for a full hospital course, for this is only a brief summary.   37yo F with PSH of sleeve gastrectomy with 9 revisions and no PMH presents with the complain of severe back pain for the past week. Admitted for pyelonephritis    Pt came to the hospital with back pain and left CVA tenderness. WBC >10K, no fever or tachycardia, CT Abdomen/Pelvis with PO and IV contrast identified a right interpole cyst. Left-sided striated nephrogram and circumferential wall thickening. SIRS Criteria= 0, qSOFA= 1 Not High Risk. UA is mildly positive. Pt diagnosed with pyelonephritis. Treated with IVF hydration, Tylenol PRN for fever, Rocephin.   On CTAP showed malpositioned IUD, Gyn consulted and recommended o/p f/u.  Pt had low blood pressures, was given IVF, improved.  Vitamin D levels were 14.5, started on 1000U vitamin D.    Patient is stable for discharge per attending and is advised to follow up with PCP as outpatient  Please refer to patient's complete medical chart with documents for a full hospital course, for this is only a brief summary.   37yo F with PSH of sleeve gastrectomy with 9 revisions and no PMH presents with the complain of severe back pain for the past week. Admitted for pyelonephritis    Pt came to the hospital with back pain and left CVA tenderness. WBC >10K, no fever or tachycardia, CT Abdomen/Pelvis with PO and IV contrast identified a right interpole cyst. Left-sided striated nephrogram and circumferential wall thickening. SIRS Criteria= 0, qSOFA= 1 Not High Risk. UA is mildly positive. Pt diagnosed with pyelonephritis. Treated with IVF hydration, Tylenol PRN for fever, Rocephin, will dc on ciprofloxacin for 7 days  On CTAP showed malpositioned IUD, Gyn consulted and recommended o/p f/u.  Pt had low blood pressures, was given IVF, improved.  Vitamin D levels were 14.5, started on 1000U vitamin D.    Patient is stable for discharge per attending and is advised to follow up with PCP as outpatient  Please refer to patient's complete medical chart with documents for a full hospital course, for this is only a brief summary.

## 2021-04-18 NOTE — DISCHARGE NOTE PROVIDER - NSDCMRMEDTOKEN_GEN_ALL_CORE_FT
acetaminophen 325 mg oral tablet: 2 tab(s) orally prn, As Needed -Mild Pain (1 - 3) MDD:1300mg  amoxicillin-clavulanate 500 mg-125 mg oral tablet: 1 tab(s) orally 2 times a day   D 1000 intl units (25 mcg) oral tablet: 1 tab(s) orally once a day x 30 days    acetaminophen 325 mg oral tablet: 2 tab(s) orally prn, As Needed -Mild Pain (1 - 3) MDD:1300mg  Cipro 500 mg oral tablet: 1 tab(s) orally 2 times a day   D 1000 intl units (25 mcg) oral tablet: 1 tab(s) orally once a day x 30 days

## 2021-04-19 VITALS
HEART RATE: 80 BPM | TEMPERATURE: 98 F | SYSTOLIC BLOOD PRESSURE: 100 MMHG | OXYGEN SATURATION: 100 % | DIASTOLIC BLOOD PRESSURE: 65 MMHG | RESPIRATION RATE: 18 BRPM

## 2021-04-19 DIAGNOSIS — Z30.431 ENCOUNTER FOR ROUTINE CHECKING OF INTRAUTERINE CONTRACEPTIVE DEVICE: ICD-10-CM

## 2021-04-19 LAB
ALBUMIN SERPL ELPH-MCNC: 3 G/DL — LOW (ref 3.5–5)
ALP SERPL-CCNC: 42 U/L — SIGNIFICANT CHANGE UP (ref 40–120)
ALT FLD-CCNC: 17 U/L DA — SIGNIFICANT CHANGE UP (ref 10–60)
ANION GAP SERPL CALC-SCNC: 5 MMOL/L — SIGNIFICANT CHANGE UP (ref 5–17)
AST SERPL-CCNC: 10 U/L — SIGNIFICANT CHANGE UP (ref 10–40)
BASOPHILS # BLD AUTO: 0.04 K/UL — SIGNIFICANT CHANGE UP (ref 0–0.2)
BASOPHILS NFR BLD AUTO: 0.5 % — SIGNIFICANT CHANGE UP (ref 0–2)
BILIRUB SERPL-MCNC: 1.3 MG/DL — HIGH (ref 0.2–1.2)
BUN SERPL-MCNC: 10 MG/DL — SIGNIFICANT CHANGE UP (ref 7–18)
CALCIUM SERPL-MCNC: 8.2 MG/DL — LOW (ref 8.4–10.5)
CHLORIDE SERPL-SCNC: 109 MMOL/L — HIGH (ref 96–108)
CO2 SERPL-SCNC: 27 MMOL/L — SIGNIFICANT CHANGE UP (ref 22–31)
CREAT SERPL-MCNC: 0.38 MG/DL — LOW (ref 0.5–1.3)
EOSINOPHIL # BLD AUTO: 0.13 K/UL — SIGNIFICANT CHANGE UP (ref 0–0.5)
EOSINOPHIL NFR BLD AUTO: 1.8 % — SIGNIFICANT CHANGE UP (ref 0–6)
GLUCOSE SERPL-MCNC: 120 MG/DL — HIGH (ref 70–99)
HCT VFR BLD CALC: 28.9 % — LOW (ref 34.5–45)
HGB BLD-MCNC: 10 G/DL — LOW (ref 11.5–15.5)
IMM GRANULOCYTES NFR BLD AUTO: 0.4 % — SIGNIFICANT CHANGE UP (ref 0–1.5)
LYMPHOCYTES # BLD AUTO: 1.99 K/UL — SIGNIFICANT CHANGE UP (ref 1–3.3)
LYMPHOCYTES # BLD AUTO: 27.3 % — SIGNIFICANT CHANGE UP (ref 13–44)
MAGNESIUM SERPL-MCNC: 2.3 MG/DL — SIGNIFICANT CHANGE UP (ref 1.6–2.6)
MCHC RBC-ENTMCNC: 31.5 PG — SIGNIFICANT CHANGE UP (ref 27–34)
MCHC RBC-ENTMCNC: 34.6 GM/DL — SIGNIFICANT CHANGE UP (ref 32–36)
MCV RBC AUTO: 91.2 FL — SIGNIFICANT CHANGE UP (ref 80–100)
MONOCYTES # BLD AUTO: 0.64 K/UL — SIGNIFICANT CHANGE UP (ref 0–0.9)
MONOCYTES NFR BLD AUTO: 8.8 % — SIGNIFICANT CHANGE UP (ref 2–14)
NEUTROPHILS # BLD AUTO: 4.45 K/UL — SIGNIFICANT CHANGE UP (ref 1.8–7.4)
NEUTROPHILS NFR BLD AUTO: 61.2 % — SIGNIFICANT CHANGE UP (ref 43–77)
NRBC # BLD: 0 /100 WBCS — SIGNIFICANT CHANGE UP (ref 0–0)
PHOSPHATE SERPL-MCNC: 2.6 MG/DL — SIGNIFICANT CHANGE UP (ref 2.5–4.5)
PLATELET # BLD AUTO: 278 K/UL — SIGNIFICANT CHANGE UP (ref 150–400)
POTASSIUM SERPL-MCNC: 4.2 MMOL/L — SIGNIFICANT CHANGE UP (ref 3.5–5.3)
POTASSIUM SERPL-SCNC: 4.2 MMOL/L — SIGNIFICANT CHANGE UP (ref 3.5–5.3)
PROT SERPL-MCNC: 6.6 G/DL — SIGNIFICANT CHANGE UP (ref 6–8.3)
RBC # BLD: 3.17 M/UL — LOW (ref 3.8–5.2)
RBC # FLD: 16.5 % — HIGH (ref 10.3–14.5)
SODIUM SERPL-SCNC: 141 MMOL/L — SIGNIFICANT CHANGE UP (ref 135–145)
WBC # BLD: 7.28 K/UL — SIGNIFICANT CHANGE UP (ref 3.8–10.5)
WBC # FLD AUTO: 7.28 K/UL — SIGNIFICANT CHANGE UP (ref 3.8–10.5)

## 2021-04-19 RX ORDER — ACETAMINOPHEN 500 MG
2 TABLET ORAL
Qty: 20 | Refills: 0
Start: 2021-04-19 | End: 2021-04-23

## 2021-04-19 RX ORDER — CHOLECALCIFEROL (VITAMIN D3) 125 MCG
1 CAPSULE ORAL
Qty: 30 | Refills: 0
Start: 2021-04-19 | End: 2021-05-18

## 2021-04-19 RX ORDER — CHOLECALCIFEROL (VITAMIN D3) 125 MCG
1000 CAPSULE ORAL DAILY
Refills: 0 | Status: DISCONTINUED | OUTPATIENT
Start: 2021-04-19 | End: 2021-04-19

## 2021-04-19 RX ORDER — MOXIFLOXACIN HYDROCHLORIDE TABLETS, 400 MG 400 MG/1
1 TABLET, FILM COATED ORAL
Qty: 14 | Refills: 0
Start: 2021-04-19 | End: 2021-04-25

## 2021-04-19 RX ADMIN — CEFTRIAXONE 100 MILLIGRAM(S): 500 INJECTION, POWDER, FOR SOLUTION INTRAMUSCULAR; INTRAVENOUS at 05:46

## 2021-04-19 RX ADMIN — Medication 650 MILLIGRAM(S): at 09:30

## 2021-04-19 RX ADMIN — Medication 650 MILLIGRAM(S): at 08:47

## 2021-04-19 NOTE — PROGRESS NOTE ADULT - SUBJECTIVE AND OBJECTIVE BOX
MED Student 3- Note discussed with supervising resident and primary attending    Patient is a 36y old  Female who presents with a chief complaint of Back pain (2021 09:50)      INTERVAL HPI/OVERNIGHT EVENTS: no events noted overnight.   The patient were seen today, no new complaint. The frequency and burning with micturition is improving. The patient remain afebrile all night. Denies chills, nausea and vomiting.    The left flank pain radiates to the entire lower back minimally improved. The patient has a IUD that shown invading the myometrium on CT.     MEDICATIONS  (STANDING):  cefTRIAXone   IVPB 1000 milliGRAM(s) IV Intermittent every 24 hours  cholecalciferol 1000 Unit(s) Oral daily  sodium chloride 0.9%. 1000 milliLiter(s) (100 mL/Hr) IV Continuous <Continuous>    MEDICATIONS  (PRN):  acetaminophen   Tablet .. 650 milliGRAM(s) Oral every 6 hours PRN Mild Pain (1 - 3)      __________________________________________________  REVIEW OF SYSTEMS:  CONSTITUTIONAL: No fever   EYES: no acute visual disturbances  NECK: No pain or stiffness  RESPIRATORY: No cough; No shortness of breath  CARDIOVASCULAR: No chest pain, no palpitations  GASTROINTESTINAL: No pain. No nausea or vomiting; No diarrhea  BACK: Bilateral lower back pain, worse on the left flank.  NEUROLOGICAL: No headache or numbness, no tremors  MUSCULOSKELETAL: No joint pain, no muscle pain  GENITOURINARY: no dysuria, no frequency, no hesitancy  PSYCHIATRY: no depression , no anxiety  ALL OTHER ROS negative        Vital Signs Last 24 Hrs  T(C): 36.7 (2021 05:10), Max: 37.1 (2021 14:06)  T(F): 98 (2021 05:10), Max: 98.7 (2021 14:06)  HR: 65 (2021 05:10) (65 - 75)  BP: 93/58 (2021 05:10) (88/40 - 101/45)  BP(mean): --  RR: 18 (2021 05:10) (18 - 18)  SpO2: 100% (2021 05:10) (100% - 100%)    ________________________________________________  PHYSICAL EXAM:  GENERAL: NAD  HEENT: Normocephalic;  conjunctivae and sclerae clear; moist mucous membranes;   NECK : supple  CHEST/LUNG: Clear to auscultation bilaterally with good air entry   HEART: S1 S2  regular; no murmurs, gallops or rubs  ABDOMEN: Soft, Nontender, Nondistended; Bowel sounds present  BACK: +ve Left CVA tenderness  EXTREMITIES: no cyanosis; no edema; no calf tenderness  SKIN: warm and dry; no rash  NERVOUS SYSTEM:  Awake and alert; Oriented  to place, person and time ; no new deficits    _________________________________________________  LABS:                        10.0   7.28  )-----------( 278      ( 2021 07:40 )             28.9     04-19    141  |  109<H>  |  10  ----------------------------<  120<H>  4.2   |  27  |  0.38<L>    Ca    8.2<L>      2021 07:40  Phos  2.6     04-  Mg     2.3     -    TPro  6.6  /  Alb  3.0<L>  /  TBili  1.3<H>  /  DBili  x   /  AST  10  /  ALT  17  /  AlkPhos  42  04-19    Urinalysis Basic - ( 2021 16:06 )    Color: Yellow / Appearance: Slightly Turbid / S.020 / pH: x  Gluc: x / Ketone: Negative  / Bili: Negative / Urobili: 1   Blood: x / Protein: 15 / Nitrite: Negative   Leuk Esterase: Trace / RBC: 5-10 /HPF / WBC 6-10 /HPF   Sq Epi: x / Non Sq Epi: Few /HPF / Bacteria: Trace /HPF    CAPILLARY BLOOD GLUCOS    RADIOLOGY & ADDITIONAL TESTS:    Imaging Personally Reviewed:  YES    Consultant(s) Notes Reviewed:   YES    Care Discussed with Consultants : YES     Plan of care was discussed with patient and /or primary care giver; all questions and concerns were addressed and care was aligned with patient's wishes.

## 2021-04-19 NOTE — DISCHARGE NOTE NURSING/CASE MANAGEMENT/SOCIAL WORK - PATIENT PORTAL LINK FT
You can access the FollowMyHealth Patient Portal offered by [facility] by registering at the following website: [email or web address]. By joining [facility]’s FollowMyHealth portal, you will also be able to view your health information using other applications (apps) compatible with our system.

## 2021-04-19 NOTE — PROGRESS NOTE ADULT - ATTENDING COMMENTS
Patient was examined at the bedside with MS3 and medical resident.     She has much less flank pain and is afebrile.   Vital Signs Last 24 Hrs  T(C): 36.9 (19 Apr 2021 17:59), Max: 36.9 (19 Apr 2021 17:59)  T(F): 98.4 (19 Apr 2021 17:59), Max: 98.4 (19 Apr 2021 17:59)  HR: 80 (19 Apr 2021 17:59) (65 - 80)  BP: 100/65 (19 Apr 2021 17:59) (93/57 - 100/65)  BP(mean): --  RR: 18 (19 Apr 2021 17:59) (18 - 18)  SpO2: 100% (19 Apr 2021 17:59) (100% - 100%)  Minimal CVA tenderness    Culture Results:   No growth     IMP:  Pyelonephritis, responding to treatment.  Urine culture was negative because of previous treatment.           s/p gastric sleeve, stable.   Plan:  Discharge home on oral quinolone x 7 days.  f/u PMD.
Patient was interviewed and examined at the bedside with Dr. Salazar.     She presented with c/o dizziness, left flank and back pain after initial treatment for a UTI.   She is s/p gastric sleeve with multiple revisions.     Alert, cooperative woman in NAD  Vital Signs Last 24 Hrs  T(C): 37.1 (18 Apr 2021 14:06), Max: 37.1 (18 Apr 2021 14:06)  T(F): 98.7 (18 Apr 2021 14:06), Max: 98.7 (18 Apr 2021 14:06)  HR: 66 (18 Apr 2021 15:02) (66 - 81)  BP: 101/45 (18 Apr 2021 15:02) (88/40 - 108/62)  BP(mean): --  RR: 18 (18 Apr 2021 14:06) (18 - 18)  SpO2: 100% (18 Apr 2021 14:06) (99% - 100%)  Lungs, clear  Cor, RRR  Abdomen, small surgical scar, healed  Back, minimal CVAT  Ext, no edema  Neurological, intact.                         10.0   6.86  )-----------( 275      ( 18 Apr 2021 06:45 )             28.5   04-18    140  |  109<H>  |  13  ----------------------------<  81  3.9   |  25  |  0.37<L>    Ca    8.0<L>      18 Apr 2021 06:45  Phos  4.0     04-18  Mg     2.3     04-18    TPro  8.1  /  Alb  4.1  /  TBili  2.6<H>  /  DBili  x   /  AST  15  /  ALT  19  /  AlkPhos  53  04-17    White Blood Cell - Urine: 6-10 /HPF (04.17.21 @ 16:0  Culture Results:   No growth (04.17.21 @ 22:03)    < from: CT Abdomen and Pelvis w/ Oral Cont and w/ IV Cont (04.17.21 @ 18:24) >    KIDNEYS/URETERS: A right interpole cyst. Left-sided striated nephrogram. No perinephric collection. No hydronephrosis.    BLADDER: Circumferential wall thickening.  REPRODUCTIVE ORGANS: Low lying intrauterine device with one of the horizontal arms possibly extending into the myometrium anteriorly. No solid adnexal masses.    BOWEL: Small to moderate stool burden throughout the colon and in the rectum. No bowel obstruction. Appendix is normal. Status post gastric surgery.  PERITONEUM: No ascites.  VESSELS: Patent portal and hepatic veins.  RETROPERITONEUM/LYMPH NODES: No lymphadenopathy.  ABDOMINAL WALL: Postsurgical changes.  BONES: Within normal limits.    IMPRESSION:  Urinary bladder wall thickening and a left-sided striated nephrogram, suspicious for cystitis-pyelonephritis.    < end of copied text >    IMP:  Pyelonephritis with left striated nephrogram, failed outpatient treatment.  Partially treated, therefore culture did not yield an organism.           thickened bladder is c/w longstanding cystitis          anemia, s/p gastric sleeve.  r/o B12, iron deficiency  Plan: Continue cefriaxone          iron studies, B12 level          Possible discharge in AM for outpatient f/u

## 2021-04-19 NOTE — CONSULT NOTE ADULT - SUBJECTIVE AND OBJECTIVE BOX
HPI: 36y  admitted for back pain with pyelonephritis. GYN was consulted for incidental finding of possible malpositioned IUD. Pt evaluated at bedside, resting comfortably and offers no acute complaints. Pt reports her IUD was inserted 1.5 years ago at [facility]. Pt states it was an emergency placement of IUD after d&c while 4 weeks pregnant. Pt reports she has had gastric sleeve surgery with complications involving 9 revisions and the IUD was placed because of this. Pt denies any vaginal bleeding, vaginal discharge, pelvic pain, abd pain, cp, sob, dizziness, palpitations, n/v/d/c, fever, chills or any other complaints     No established GYN care at this time; pt reports she was supposed to f/u with GYN at [place] who placed IUD but was not able to.     pob/gynhx:      C/S X3 , uncomplicated per pt   d&c x1   Pt denies std's, abn pap smear, fibroids or ovarian cysts, irregular menses, light flow    PAST MEDICAL & SURGICAL HISTORY:  Anemia    History of abdominoplasty    History of gastric surgery    H/O:     all: NKA  Home Medications: none    sochx: Pt denies etoh/drug/tobacco use  pt denies h/o anxiety or depression    REVIEW OF SYSTEMS: see HPI	    Vital Signs Last 24 Hrs  T(C): 36.4 (2021 13:27), Max: 36.7 (2021 05:10)  T(F): 97.5 (2021 13:27), Max: 98 (2021 05:10)  HR: 75 (2021 13:27) (65 - 75)  BP: 93/57 (2021 13:27) (93/57 - 96/58)  BP(mean): --  RR: 18 (2021 13:27) (18 - 18)  SpO2: 100% (2021 13:27) (100% - 100%)    PE  Gen: A&Ox3, NAD  abd: +bs; soft, nt, nd, no rebound or guarding  pelvis: IUD string felt; no cmt; no vaginal bleeding or abnormal discharge; no odor, No adnexal tenderness or masses appreciated b/l     LABS:                        10.0   7.28  )-----------( 278      ( 2021 07:40 )             28.9     -    141  |  109<H>  |  10  ----------------------------<  120<H>  4.2   |  27  |  0.38<L>    Ca    8.2<L>      2021 07:40  Phos  2.6       Mg     2.3         TPro  6.6  /  Alb  3.0<L>  /  TBili  1.3<H>  /  DBili  x   /  AST  10  /  ALT  17  /  AlkPhos  42        RADIOLOGY & ADDITIONAL STUDIES:    < from: CT Abdomen and Pelvis w/ Oral Cont and w/ IV Cont (21 @ 18:24) >    EXAM:  CT ABDOMEN AND PELVIS OC IC                            PROCEDURE DATE:  2021        INTERPRETATION:  CLINICAL INFORMATION: Left-sided abdominal pain. History of gastric sleeve. Bilateral CVA tenderness.    COMPARISON: None.    CONTRAST/COMPLICATIONS:  IV Contrast: Omnipaque 350  90 cc administered   10 cc discarded  Oral Contrast: Omnipaque 300  Complications: None reported at time of study completion    PROCEDURE:  CT of the Abdomen and Pelvis was performed.  Sagittal and coronal reformats were performed.    FINDINGS:  LOWER CHEST: Within normal limits.    LIVER: Within normal limits.  BILE DUCTS: Normal caliber.  GALLBLADDER: Cholelithiasis.  SPLEEN: Within normal limits.  PANCREAS: Within normal limits.  ADRENALS: Within normal limits.  KIDNEYS/URETERS: A right interpole cyst. Left-sided striated nephrogram. No perinephric collection. No hydronephrosis.    BLADDER: Circumferential wall thickening.  REPRODUCTIVE ORGANS: Low lying intrauterine device with one of the horizontal arms possibly extending into the myometrium anteriorly. No solid adnexal masses.    BOWEL: Small to moderate stool burden throughout the colon and in the rectum. No bowel obstruction. Appendix is normal. Status post gastric surgery.  PERITONEUM: No ascites.  VESSELS: Patent portal and hepatic veins.  RETROPERITONEUM/LYMPH NODES: No lymphadenopathy.  ABDOMINAL WALL: Postsurgical changes.  BONES: Within normal limits.    IMPRESSION:  Urinary bladder wall thickening and a left-sided striated nephrogram, suspicious for cystitis-pyelonephritis.    Low-lying and possibly malpositioned intrauterine device with question extension of one of the horizontal arms into the myometrium. Recommend assessment to ascertain appropriate positioning.    MARNIE FIGUEREDO M.D., ATTENDING RADIOLOGIST  This document has been electronically signed. 2021  6:51PM

## 2021-04-19 NOTE — PROGRESS NOTE ADULT - PROBLEM SELECTOR PLAN 1
-Pt coming in with back pain with left CVA tenderness  -WBC >10K, no fever or tachycardia  -CT Abdomen/Pelvis with PO and IV contrast identified a right interpole cyst. Left-sided striated nephrogram and circumferential wall thickening.  -SIRS Criteria= 0 + likely source of infection (UTI)  -qSOFA= 1 Not High Risk  -UA is mildly positive  -c/w IVF hydration  -Tylenol PRN for fever  -c/w Rocephin  -Follow urine and blood cultures though pt has been on abx out patient
-Pt coming in with back pain with left CVA tenderness  -WBC >10K, no fever or tachycardia  -CT Abdomen/Pelvis with PO and IV contrast identified a right interpole cyst. Left-sided striated nephrogram and circumferential wall thickening.  -SIRS Criteria= 0 + likely source of infection (UTI)  -qSOFA= 1 Not High Risk  -UA WBCs is 10, negative for Nitrite.   D/C today  -Will continue with IVF hydration  -Tylenol PRN for fever  -Will start on Rocephin  -Follow urine and blood cultures though pt has been on abx out patient

## 2021-04-19 NOTE — CONSULT NOTE ADULT - ASSESSMENT
A/p: 36 y.o female admitted for pyelonephritis; incidental finding of possible malpositioned IUD in CT scan, pt stable   -no acute GYN intervention at this time   -pt recommended outpatient follow up with [facility], information given to patient   -continue management per medicine team, planning discharge   -case d/w Dr. Lopez, house attending

## 2021-04-19 NOTE — CONSULT NOTE ADULT - CONSULT REQUESTED BY NAME
Noted.   Forwarded to [facility].     Ana Cristobal MS3, supervising resident and attending

## 2021-04-19 NOTE — PROGRESS NOTE ADULT - PROBLEM SELECTOR PLAN 3
Incidental finding of IUD Malposition with extension of the transverse arm into the myometrium.   pt has bilateral Lower back pain inconsistent with left pyelonephritis  Consulted Gynecology
IMPROVE VTE Individual Risk Assessment  RISK                                                                Points  [  ] Previous VTE                                                  3  [  ] Thrombophilia                                               2  [  ] Lower limb paralysis                                      2        (unable to hold up >15 seconds)    [  ] Current Cancer                                              2         (within 6 months)  [  ] Immobilization > 24 hrs                                1  [  ] ICU/CCU stay > 24 hours                              1  [  ] Age > 60                                                      1  IMPROVE VTE Score 0, -- for DVT proph    No DVT ppx required

## 2021-04-23 LAB
CULTURE RESULTS: SIGNIFICANT CHANGE UP
CULTURE RESULTS: SIGNIFICANT CHANGE UP
SPECIMEN SOURCE: SIGNIFICANT CHANGE UP
SPECIMEN SOURCE: SIGNIFICANT CHANGE UP

## 2021-07-15 NOTE — DISCHARGE NOTE NURSING/CASE MANAGEMENT/SOCIAL WORK - NSDCVIVACCINE_GEN_ALL_CORE_FT
Writer notified pt.  Pt stated understanding of instructions to notify of medications No Vaccines Administered.

## 2021-09-13 NOTE — PROGRESS NOTE ADULT - ASSESSMENT
35yo F with PSH of sleeve gastrecomy with 9 revisions and no PMH presents with the complain of severe back pain for the past week. Admitted for pyelonephritis Doxepin Pregnancy And Lactation Text: This medication is Pregnancy Category C and it isn't known if it is safe during pregnancy. It is also excreted in breast milk and breast feeding isn't recommended.

## 2021-11-19 ENCOUNTER — EMERGENCY (EMERGENCY)
Facility: HOSPITAL | Age: 36
LOS: 1 days | Discharge: HOME | End: 2021-11-19
Attending: STUDENT IN AN ORGANIZED HEALTH CARE EDUCATION/TRAINING PROGRAM
Payer: MEDICAID

## 2021-11-19 VITALS
RESPIRATION RATE: 17 BRPM | OXYGEN SATURATION: 97 % | WEIGHT: 149.91 LBS | HEART RATE: 86 BPM | SYSTOLIC BLOOD PRESSURE: 106 MMHG | HEIGHT: 65 IN | TEMPERATURE: 98 F | DIASTOLIC BLOOD PRESSURE: 74 MMHG

## 2021-11-19 DIAGNOSIS — Z98.890 OTHER SPECIFIED POSTPROCEDURAL STATES: Chronic | ICD-10-CM

## 2021-11-19 DIAGNOSIS — Z98.891 HISTORY OF UTERINE SCAR FROM PREVIOUS SURGERY: Chronic | ICD-10-CM

## 2021-11-19 RX ORDER — KETOROLAC TROMETHAMINE 30 MG/ML
30 SYRINGE (ML) INJECTION ONCE
Refills: 0 | Status: DISCONTINUED | OUTPATIENT
Start: 2021-11-19 | End: 2021-11-19

## 2021-11-19 RX ORDER — ACETAMINOPHEN 500 MG
975 TABLET ORAL ONCE
Refills: 0 | Status: COMPLETED | OUTPATIENT
Start: 2021-11-19 | End: 2021-11-19

## 2021-11-19 RX ORDER — LIDOCAINE 4 G/100G
1 CREAM TOPICAL ONCE
Refills: 0 | Status: COMPLETED | OUTPATIENT
Start: 2021-11-19 | End: 2021-11-19

## 2021-11-19 RX ORDER — DIAZEPAM 5 MG
2 TABLET ORAL ONCE
Refills: 0 | Status: DISCONTINUED | OUTPATIENT
Start: 2021-11-19 | End: 2021-11-19

## 2021-11-19 RX ADMIN — Medication 975 MILLIGRAM(S): at 18:57

## 2021-11-19 RX ADMIN — Medication 975 MILLIGRAM(S): at 20:24

## 2021-11-19 RX ADMIN — Medication 30 MILLIGRAM(S): at 18:57

## 2021-11-19 RX ADMIN — Medication 30 MILLIGRAM(S): at 20:24

## 2021-11-19 RX ADMIN — LIDOCAINE 1 PATCH: 4 CREAM TOPICAL at 18:57

## 2021-11-19 RX ADMIN — Medication 2 MILLIGRAM(S): at 18:57

## 2021-11-19 NOTE — ED PROVIDER NOTE - OBJECTIVE STATEMENT
36 year old female with no significant PMHx presents to the ED with complaints of low back pain which started while lifting some boxes at work three days ago. Patient describes the pain as a dull and aching sensation to her bilateral lower back which occasionally radiates down the back of her leg. Patient denies any other symptoms including numbness, weakness, or incontinence. Patient states that she is already scheduled for an appointment with a back specialist on 11/22, however decided to come to the ED today as the pain has not gotten any better. NKDA.

## 2021-11-19 NOTE — ED PROVIDER NOTE - PROGRESS NOTE DETAILS
Pt feeling much better, ambulating without issue, calling family to pick her up. Will see back pain specialist on Monday. Strict return precautions given.

## 2021-11-19 NOTE — ED PROVIDER NOTE - PATIENT PORTAL LINK FT
You can access the FollowMyHealth Patient Portal offered by [facility] by registering at the following website: [email or web address]. By joining [employer]’s FollowMyHealth portal, you will also be able to view your health information using other applications (apps) compatible with our system.

## 2021-11-19 NOTE — ED ADULT NURSE NOTE - OBJECTIVE STATEMENT
States she has lower back pain after lifting something heavy a couple of days ago , since then with pain to lower back .

## 2021-11-19 NOTE — ED PROVIDER NOTE - NSFOLLOWUPINSTRUCTIONS_ED_ALL_ED_FT
Follow up with your PCP and back pain specialist in 24-48 hours.   May take Tylenol and Motrin as directed on the bottle for pain control.   Return to the ER if you develop any new or worsening symptoms such as fever, worsening back pain, numbness, weakness, chest pain, shortness of breath, abdominal pain, nausea, vomiting, or visual changes.

## 2021-11-19 NOTE — ED PROVIDER NOTE - MUSCULOSKELETAL, MLM
Mild tenderness over the bilateral paraspinal lumbar area. No midline spinal tenderness. No saddle anesthesia. Normal gait.

## 2021-11-19 NOTE — ED PROVIDER NOTE - CLINICAL SUMMARY MEDICAL DECISION MAKING FREE TEXT BOX
Exam and history consistent with lumbar radiculopathy vs. muscle strain. No concern for cord compression. Will medicate and reassess.

## 2021-11-20 PROBLEM — D64.9 ANEMIA, UNSPECIFIED: Chronic | Status: ACTIVE | Noted: 2021-04-17

## 2021-11-29 ENCOUNTER — EMERGENCY (EMERGENCY)
Facility: HOSPITAL | Age: 36
LOS: 1 days | Discharge: HOME | End: 2021-11-29
Attending: STUDENT IN AN ORGANIZED HEALTH CARE EDUCATION/TRAINING PROGRAM
Payer: MEDICAID

## 2021-11-29 VITALS
DIASTOLIC BLOOD PRESSURE: 65 MMHG | OXYGEN SATURATION: 99 % | RESPIRATION RATE: 18 BRPM | SYSTOLIC BLOOD PRESSURE: 112 MMHG | TEMPERATURE: 98 F | HEART RATE: 70 BPM

## 2021-11-29 VITALS
SYSTOLIC BLOOD PRESSURE: 113 MMHG | RESPIRATION RATE: 18 BRPM | OXYGEN SATURATION: 98 % | TEMPERATURE: 98 F | HEART RATE: 87 BPM | HEIGHT: 65 IN | DIASTOLIC BLOOD PRESSURE: 75 MMHG | WEIGHT: 145.06 LBS

## 2021-11-29 DIAGNOSIS — Z98.891 HISTORY OF UTERINE SCAR FROM PREVIOUS SURGERY: Chronic | ICD-10-CM

## 2021-11-29 DIAGNOSIS — Z98.890 OTHER SPECIFIED POSTPROCEDURAL STATES: Chronic | ICD-10-CM

## 2021-11-29 LAB
ALBUMIN SERPL ELPH-MCNC: 4.4 G/DL — SIGNIFICANT CHANGE UP (ref 3.5–5)
ALP SERPL-CCNC: 41 U/L — SIGNIFICANT CHANGE UP (ref 40–120)
ALT FLD-CCNC: 20 U/L DA — SIGNIFICANT CHANGE UP (ref 10–60)
ANION GAP SERPL CALC-SCNC: 7 MMOL/L — SIGNIFICANT CHANGE UP (ref 5–17)
AST SERPL-CCNC: 12 U/L — SIGNIFICANT CHANGE UP (ref 10–40)
BASOPHILS # BLD AUTO: 0.05 K/UL — SIGNIFICANT CHANGE UP (ref 0–0.2)
BASOPHILS NFR BLD AUTO: 0.4 % — SIGNIFICANT CHANGE UP (ref 0–2)
BILIRUB SERPL-MCNC: 2.8 MG/DL — HIGH (ref 0.2–1.2)
BUN SERPL-MCNC: 17 MG/DL — SIGNIFICANT CHANGE UP (ref 7–18)
CALCIUM SERPL-MCNC: 8.9 MG/DL — SIGNIFICANT CHANGE UP (ref 8.4–10.5)
CHLORIDE SERPL-SCNC: 109 MMOL/L — HIGH (ref 96–108)
CO2 SERPL-SCNC: 24 MMOL/L — SIGNIFICANT CHANGE UP (ref 22–31)
CREAT SERPL-MCNC: 0.68 MG/DL — SIGNIFICANT CHANGE UP (ref 0.5–1.3)
EOSINOPHIL # BLD AUTO: 0.17 K/UL — SIGNIFICANT CHANGE UP (ref 0–0.5)
EOSINOPHIL NFR BLD AUTO: 1.2 % — SIGNIFICANT CHANGE UP (ref 0–6)
GLUCOSE SERPL-MCNC: 78 MG/DL — SIGNIFICANT CHANGE UP (ref 70–99)
HCG SERPL-ACNC: <1 MIU/ML — SIGNIFICANT CHANGE UP
HCT VFR BLD CALC: 35.8 % — SIGNIFICANT CHANGE UP (ref 34.5–45)
HGB BLD-MCNC: 12.7 G/DL — SIGNIFICANT CHANGE UP (ref 11.5–15.5)
HIV 1 & 2 AB SERPL IA.RAPID: SIGNIFICANT CHANGE UP
IMM GRANULOCYTES NFR BLD AUTO: 0.4 % — SIGNIFICANT CHANGE UP (ref 0–1.5)
LYMPHOCYTES # BLD AUTO: 2.88 K/UL — SIGNIFICANT CHANGE UP (ref 1–3.3)
LYMPHOCYTES # BLD AUTO: 20.4 % — SIGNIFICANT CHANGE UP (ref 13–44)
MAGNESIUM SERPL-MCNC: 2.3 MG/DL — SIGNIFICANT CHANGE UP (ref 1.6–2.6)
MCHC RBC-ENTMCNC: 31.6 PG — SIGNIFICANT CHANGE UP (ref 27–34)
MCHC RBC-ENTMCNC: 35.5 GM/DL — SIGNIFICANT CHANGE UP (ref 32–36)
MCV RBC AUTO: 89.1 FL — SIGNIFICANT CHANGE UP (ref 80–100)
MONOCYTES # BLD AUTO: 1.02 K/UL — HIGH (ref 0–0.9)
MONOCYTES NFR BLD AUTO: 7.2 % — SIGNIFICANT CHANGE UP (ref 2–14)
NEUTROPHILS # BLD AUTO: 9.97 K/UL — HIGH (ref 1.8–7.4)
NEUTROPHILS NFR BLD AUTO: 70.4 % — SIGNIFICANT CHANGE UP (ref 43–77)
NRBC # BLD: 0 /100 WBCS — SIGNIFICANT CHANGE UP (ref 0–0)
PLATELET # BLD AUTO: 357 K/UL — SIGNIFICANT CHANGE UP (ref 150–400)
POTASSIUM SERPL-MCNC: 4.9 MMOL/L — SIGNIFICANT CHANGE UP (ref 3.5–5.3)
POTASSIUM SERPL-SCNC: 4.9 MMOL/L — SIGNIFICANT CHANGE UP (ref 3.5–5.3)
PROT SERPL-MCNC: 8.2 G/DL — SIGNIFICANT CHANGE UP (ref 6–8.3)
RBC # BLD: 4.02 M/UL — SIGNIFICANT CHANGE UP (ref 3.8–5.2)
RBC # FLD: 15.1 % — HIGH (ref 10.3–14.5)
SODIUM SERPL-SCNC: 140 MMOL/L — SIGNIFICANT CHANGE UP (ref 135–145)
T4 AB SER-ACNC: 10.3 UG/DL — SIGNIFICANT CHANGE UP (ref 4.6–12)
TROPONIN I, HIGH SENSITIVITY RESULT: 4.3 NG/L — SIGNIFICANT CHANGE UP
TSH SERPL-MCNC: 0.83 UU/ML — SIGNIFICANT CHANGE UP (ref 0.34–4.82)
WBC # BLD: 14.15 K/UL — HIGH (ref 3.8–10.5)
WBC # FLD AUTO: 14.15 K/UL — HIGH (ref 3.8–10.5)

## 2021-11-29 RX ORDER — SODIUM CHLORIDE 9 MG/ML
1000 INJECTION INTRAMUSCULAR; INTRAVENOUS; SUBCUTANEOUS ONCE
Refills: 0 | Status: COMPLETED | OUTPATIENT
Start: 2021-11-29 | End: 2021-11-29

## 2021-11-29 RX ADMIN — Medication 1 MILLIGRAM(S): at 19:45

## 2021-11-29 RX ADMIN — SODIUM CHLORIDE 1000 MILLILITER(S): 9 INJECTION INTRAMUSCULAR; INTRAVENOUS; SUBCUTANEOUS at 19:45

## 2021-11-29 RX ADMIN — SODIUM CHLORIDE 1000 MILLILITER(S): 9 INJECTION INTRAMUSCULAR; INTRAVENOUS; SUBCUTANEOUS at 21:21

## 2021-11-29 NOTE — ED ADULT NURSE NOTE - NSFALLRSKHARMRISK_ED_ALL_ED
Subjective   Cruz Mims is a 28 y.o. male     Hospital Day #1    Chief Complaint:  psychosis    HPI:  History of Present Illness  Today he is starting to feel better.  He says the auditory hallucinations have resolved.  He says the Invega is working even better than Risperdal.  He is still interested in getting the Invega Sustenna injection.  He feels a little tired from the Invega but otherwise denies any side effects.    Anxiety rating 1/10  Depression rating 1/10  Sleep: fair    Review of Systems   Constitutional: Positive for fatigue.   Respiratory: Negative for shortness of breath.    Cardiovascular: Negative for chest pain.   Gastrointestinal: Negative for nausea.   Musculoskeletal: Positive for myalgias. Negative for neck stiffness.   Neurological: Negative for tremors.   All other systems reviewed and are negative.      Objective   Physical Exam    Wt Readings from Last 3 Encounters:   03/21/17 155 lb (70.3 kg)   03/20/17 148 lb (67.1 kg)   12/31/16 154 lb (69.9 kg)     Temp Readings from Last 3 Encounters:   03/22/17 98 °F (36.7 °C) (Temporal Artery )   03/20/17 98.1 °F (36.7 °C) (Oral)   01/02/17 99 °F (37.2 °C) (Tympanic)     BP Readings from Last 3 Encounters:   03/22/17 116/77   03/20/17 126/81   01/02/17 145/84     Pulse Readings from Last 3 Encounters:   03/22/17 69   03/20/17 100   01/02/17 118       Allergies   Allergen Reactions   • Amoxicillin    • Penicillins        Lab Results (last 24 hours)     ** No results found for the last 24 hours. **          Imaging Results (last 24 hours)     ** No results found for the last 24 hours. **          Current Medications:     nicotine 1 patch Transdermal Q24H   paliperidone 6 mg Oral Daily     •  acetaminophen  •  aluminum-magnesium hydroxide-simethicone  •  benzonatate  •  benztropine **OR** benztropine  •  hydrOXYzine  •  LORazepam  •  magnesium hydroxide  •  OLANZapine zydis  •  ondansetron  •  sodium chloride  •  traZODone  •   "ziprasidone      Mental Status Exam:   Appearance: Neatly, casually dressed, good hygiene.   Cooperation: Cooperative  Orientation: Ox4  Gait and station stable.   Psychomotor: No psychomotor agitation/retardation, No EPS, No motor tics  Speech: normal rate, amount.  Language: intact  Fund of Knowledge: average  Mood \"a little better\"   Affect: congruent/appropriate.  Associations: intact  Thought Content: goal directed, no delusional material present  Thought process: linear, organized.  Suicidality: Denies SI  Homicidality: Denies HI  Perception: Denies AH/VH  Memory is intact for recent and remote events  Attention/Concentration: good  Impulse control: good  Insight: fair   Judgement: fair    Special Precaution Level: Continue current level of special precautions    Assessment/Plan:   Assessment/Plan   Patient Active Problem List   Diagnosis Code   • Schizoaffective disorder, bipolar type F25.0       · He is definitely showing some improvements today.  We will attempt to give the Invega Sustenna injection 234 mg tomorrow 3/23/17 and then discharge home afterward.  He will need his next injection of 156 mg on 3/30/17, followed by 117 mg monthly thereafter.  He will get his injection here at [facility].  He will follow-up at [facility], but they do not give injections there..    We discussed risks, benefits, and side effects of the above medication and the patient was agreeable with the plan. I have reviewed the treatment plan and agree with current plan.  Treatment was discussed with the patient who is agreeable to this treatment and plan.           " no

## 2021-11-29 NOTE — ED PROVIDER NOTE - OBJECTIVE STATEMENT
36f, no significant pmh presenting with anxiety and panic attacks. patient reports she often has panic attacks but they have become more frequent over the past 3 three days and are occurring multiple times a day. feels shortness of breath, chest pressure and "shaky". no fever, nausea, vomiting, abdominal pain. not previously seeing a psychiatrist but is seeing someone about it now.

## 2021-11-29 NOTE — ED ADULT NURSE NOTE - OBJECTIVE STATEMENT
chest pressure x 3 days , vomiting today , anxious , shaking.bld.drawn and sent to lab.medication given as ordered

## 2021-11-29 NOTE — ED PROVIDER NOTE - PATIENT PORTAL LINK FT
You can access the FollowMyHealth Patient Portal offered by [facility] by registering at the following website: [email or web address]. By joining [employer]’s FollowMyHealth portal, you will also be able to view your health information using other applications (apps) compatible with our system.

## 2021-11-29 NOTE — ED PROVIDER NOTE - NSFOLLOWUPINSTRUCTIONS_ED_ALL_ED_FT
You were seen in the emergency department for panic attacks.     Please follow-up with your primary care doctor in the next 24-48 hours.     Please follow-up with a psychiatrist in the next week.     If you have an y worsening symptoms, severe chest pain, shortness of breath, or you have any thoughts of hurting yourself, please return to the emergency department.

## 2021-11-29 NOTE — ED PROVIDER NOTE - CLINICAL SUMMARY MEDICAL DECISION MAKING FREE TEXT BOX
36F presenting with chest pain and shortness of breath likely 2/2 anxiety. no SI or previous attempts. will get labs to assess for electrolyte abnormalities.

## 2021-11-30 LAB — T3 SERPL-MCNC: 91 NG/DL — SIGNIFICANT CHANGE UP (ref 80–200)

## 2022-07-17 ENCOUNTER — EMERGENCY (EMERGENCY)
Facility: HOSPITAL | Age: 37
LOS: 1 days | Discharge: HOME | End: 2022-07-17
Attending: EMERGENCY MEDICINE
Payer: MEDICAID

## 2022-07-17 VITALS
SYSTOLIC BLOOD PRESSURE: 103 MMHG | HEIGHT: 65 IN | DIASTOLIC BLOOD PRESSURE: 70 MMHG | HEART RATE: 58 BPM | OXYGEN SATURATION: 100 % | WEIGHT: 149.91 LBS | RESPIRATION RATE: 16 BRPM | TEMPERATURE: 98 F

## 2022-07-17 VITALS
TEMPERATURE: 98 F | RESPIRATION RATE: 17 BRPM | OXYGEN SATURATION: 98 % | SYSTOLIC BLOOD PRESSURE: 111 MMHG | HEART RATE: 62 BPM | DIASTOLIC BLOOD PRESSURE: 69 MMHG

## 2022-07-17 DIAGNOSIS — Z98.890 OTHER SPECIFIED POSTPROCEDURAL STATES: Chronic | ICD-10-CM

## 2022-07-17 DIAGNOSIS — Z98.891 HISTORY OF UTERINE SCAR FROM PREVIOUS SURGERY: Chronic | ICD-10-CM

## 2022-07-17 LAB
ALBUMIN SERPL ELPH-MCNC: 3.9 G/DL — SIGNIFICANT CHANGE UP (ref 3.5–5)
ALP SERPL-CCNC: 37 U/L — LOW (ref 40–120)
ALT FLD-CCNC: 20 U/L DA — SIGNIFICANT CHANGE UP (ref 10–60)
ANION GAP SERPL CALC-SCNC: 6 MMOL/L — SIGNIFICANT CHANGE UP (ref 5–17)
APPEARANCE UR: CLEAR — SIGNIFICANT CHANGE UP
AST SERPL-CCNC: 12 U/L — SIGNIFICANT CHANGE UP (ref 10–40)
BACTERIA # UR AUTO: ABNORMAL /HPF
BASOPHILS # BLD AUTO: 0.04 K/UL — SIGNIFICANT CHANGE UP (ref 0–0.2)
BASOPHILS NFR BLD AUTO: 0.4 % — SIGNIFICANT CHANGE UP (ref 0–2)
BILIRUB SERPL-MCNC: 2.5 MG/DL — HIGH (ref 0.2–1.2)
BILIRUB UR-MCNC: NEGATIVE — SIGNIFICANT CHANGE UP
BUN SERPL-MCNC: 12 MG/DL — SIGNIFICANT CHANGE UP (ref 7–18)
CALCIUM SERPL-MCNC: 9 MG/DL — SIGNIFICANT CHANGE UP (ref 8.4–10.5)
CHLORIDE SERPL-SCNC: 111 MMOL/L — HIGH (ref 96–108)
CO2 SERPL-SCNC: 24 MMOL/L — SIGNIFICANT CHANGE UP (ref 22–31)
COLOR SPEC: YELLOW — SIGNIFICANT CHANGE UP
CREAT SERPL-MCNC: 0.6 MG/DL — SIGNIFICANT CHANGE UP (ref 0.5–1.3)
DIFF PNL FLD: ABNORMAL
EGFR: 117 ML/MIN/1.73M2 — SIGNIFICANT CHANGE UP
EOSINOPHIL # BLD AUTO: 0.36 K/UL — SIGNIFICANT CHANGE UP (ref 0–0.5)
EOSINOPHIL NFR BLD AUTO: 3.8 % — SIGNIFICANT CHANGE UP (ref 0–6)
EPI CELLS # UR: ABNORMAL /HPF
FLUAV AG NPH QL: SIGNIFICANT CHANGE UP
FLUBV AG NPH QL: SIGNIFICANT CHANGE UP
GLUCOSE SERPL-MCNC: 78 MG/DL — SIGNIFICANT CHANGE UP (ref 70–99)
GLUCOSE UR QL: NEGATIVE — SIGNIFICANT CHANGE UP
HCG SERPL-ACNC: <1 MIU/ML — SIGNIFICANT CHANGE UP
HCT VFR BLD CALC: 32.3 % — LOW (ref 34.5–45)
HGB BLD-MCNC: 11.6 G/DL — SIGNIFICANT CHANGE UP (ref 11.5–15.5)
IMM GRANULOCYTES NFR BLD AUTO: 0.3 % — SIGNIFICANT CHANGE UP (ref 0–1.5)
KETONES UR-MCNC: NEGATIVE — SIGNIFICANT CHANGE UP
LEUKOCYTE ESTERASE UR-ACNC: NEGATIVE — SIGNIFICANT CHANGE UP
LIDOCAIN IGE QN: 255 U/L — SIGNIFICANT CHANGE UP (ref 73–393)
LYMPHOCYTES # BLD AUTO: 2.62 K/UL — SIGNIFICANT CHANGE UP (ref 1–3.3)
LYMPHOCYTES # BLD AUTO: 27.8 % — SIGNIFICANT CHANGE UP (ref 13–44)
MCHC RBC-ENTMCNC: 32.1 PG — SIGNIFICANT CHANGE UP (ref 27–34)
MCHC RBC-ENTMCNC: 35.9 GM/DL — SIGNIFICANT CHANGE UP (ref 32–36)
MCV RBC AUTO: 89.5 FL — SIGNIFICANT CHANGE UP (ref 80–100)
MONOCYTES # BLD AUTO: 0.66 K/UL — SIGNIFICANT CHANGE UP (ref 0–0.9)
MONOCYTES NFR BLD AUTO: 7 % — SIGNIFICANT CHANGE UP (ref 2–14)
NEUTROPHILS # BLD AUTO: 5.7 K/UL — SIGNIFICANT CHANGE UP (ref 1.8–7.4)
NEUTROPHILS NFR BLD AUTO: 60.7 % — SIGNIFICANT CHANGE UP (ref 43–77)
NITRITE UR-MCNC: NEGATIVE — SIGNIFICANT CHANGE UP
NRBC # BLD: 0 /100 WBCS — SIGNIFICANT CHANGE UP (ref 0–0)
PH UR: 6 — SIGNIFICANT CHANGE UP (ref 5–8)
PLATELET # BLD AUTO: 286 K/UL — SIGNIFICANT CHANGE UP (ref 150–400)
POTASSIUM SERPL-MCNC: 4.3 MMOL/L — SIGNIFICANT CHANGE UP (ref 3.5–5.3)
POTASSIUM SERPL-SCNC: 4.3 MMOL/L — SIGNIFICANT CHANGE UP (ref 3.5–5.3)
PROT SERPL-MCNC: 7.1 G/DL — SIGNIFICANT CHANGE UP (ref 6–8.3)
PROT UR-MCNC: NEGATIVE — SIGNIFICANT CHANGE UP
RBC # BLD: 3.61 M/UL — LOW (ref 3.8–5.2)
RBC # FLD: 15 % — HIGH (ref 10.3–14.5)
RBC CASTS # UR COMP ASSIST: ABNORMAL /HPF (ref 0–2)
SARS-COV-2 RNA SPEC QL NAA+PROBE: SIGNIFICANT CHANGE UP
SODIUM SERPL-SCNC: 141 MMOL/L — SIGNIFICANT CHANGE UP (ref 135–145)
SP GR SPEC: 1.02 — SIGNIFICANT CHANGE UP (ref 1.01–1.02)
UROBILINOGEN FLD QL: 1
WBC # BLD: 9.41 K/UL — SIGNIFICANT CHANGE UP (ref 3.8–10.5)
WBC # FLD AUTO: 9.41 K/UL — SIGNIFICANT CHANGE UP (ref 3.8–10.5)
WBC UR QL: SIGNIFICANT CHANGE UP /HPF (ref 0–5)

## 2022-07-17 RX ORDER — KETOROLAC TROMETHAMINE 30 MG/ML
30 SYRINGE (ML) INJECTION ONCE
Refills: 0 | Status: DISCONTINUED | OUTPATIENT
Start: 2022-07-17 | End: 2022-07-17

## 2022-07-17 RX ADMIN — Medication 30 MILLIGRAM(S): at 18:13

## 2022-07-17 NOTE — ED PROVIDER NOTE - CLINICAL SUMMARY MEDICAL DECISION MAKING FREE TEXT BOX
Pt w/ aforementioned presentation concerning for but not limited to __   Will get labs, imaging, treat symptoms, monitor and reassess. Pt w/ aforementioned presentation concerning for but not limited to uti, diverticulitis, appendicitis, colitis, other causes of abd pain  Will get labs, imaging, treat symptoms, monitor and reassess.    CT a/p w/ contrast was attempted but tech reports all of the contrast leaked out from the IV line and none of it showed up on the CT scan. Study changed to noncontrast to reflect this event. if the radiologist requires contrast it will be reordered.    Noncontrast CT read as limited 2/2 to lack of contrast. CT re-ordered. Pt reports she does not wish to wait for the CT repeat because she has children at home. She has an appointment tomorrow with a specialist but will return to ED if pain worsens. Patient wishes to leave the emergency department at this time.  The patient is leaving against medical advice and understands that this involves the risk of missing a potentially serious diagnosis which may lead to injury, permanent disability and/or death. The patient demonstrates understanding of all risks, is awake and alert and demonstrates competence to make medical decisions.  I was unable to convince the patient to stay for further workup and monitoring. The patient was given an opportunity to ask any questions. The patient was offered and understands the option to return at any time if desired. I discussed with the patient the importance of close, prompt medical follow-up.

## 2022-07-17 NOTE — ED PROVIDER NOTE - CARE PLAN
Patient says that he has been walking off balance and wants to know if this could be coming from his back.   Principal Discharge DX:	Abdominal pain   1

## 2022-07-17 NOTE — ED PROVIDER NOTE - NSICDXPASTSURGICALHX_GEN_ALL_CORE_FT
PAST SURGICAL HISTORY:  H/O:      History of abdominoplasty     History of gastric surgery       H/O lumbar discectomy

## 2022-07-17 NOTE — ED PROVIDER NOTE - PHYSICAL EXAMINATION
General: pt lying in stretcher, appears stated age and is not in distress  HEENT: AT/NC, pink conjunctiva, anicteric sclerae, EOMI, PERRLA, TMs smooth, grey, intact, with normal landmarks, nasal mucosa pink, no discharge, turbinates not enlarged; moist mucus membranes, tongue well-papillated, good dentition; posterior pharynx shows no erythema or exudates;   Neck: supple, full ROM, trachea midline, no JVD, no cervical LAD, no midline ttp or stepoffs  Lungs: symmetric excursion, b/l clear vesicular breath sounds with no wheezes, crackles, or rhonchi  Heart: rrr, S1, S2 normal; no S3 or S4; no murmurs or rubs  Abd: normal bowel sounds; soft, nontender; negative McBurney's point tenderness, negative Mednieta's sign, no rebound, no guarding, spleen non-palpable; no hepatomegaly, no masses  Back: no midline spinal tenderness or stepoffs, no costovertebral angle tenderness  Extremities: no clubbing, cyanosis, or edema; no palpable deformities or fractures  Skin: good turgor; no rashes, petechiae, ecchymoses, or jaundice  Pulses: radial, posterior tibialis (PT), dorsalis pedis (DP) all 2+ & symmetric  Neuro: awake, alert, responsive; oriented to person, place and time; cranial nerves intact, EOMI, intact jaw movement, intact facial sensation, no facial asymmetry, hearing intact; no nystagmus, tongue midline; Motor: Normal tone in upper and lower extremities bilaterally strength 5/5; Sensory: intact to pinprick and light touch; Cerebellar: finger-to-nose intact; normal steady gait; negative Romberg’s sign; DTR: biceps, triceps, patellar, 2+, no pronator drift General: pt lying in stretcher, appears stated age and is not in distress  HEENT: AT/NC, pink conjunctiva, anicteric sclerae, EOMI, mmm  Neck: supple, full ROM, trachea midline, no JVD, no cervical LAD, no midline ttp or stepoffs  Lungs: symmetric excursion, b/l clear vesicular breath sounds with no wheezes, crackles, or rhonchi  Heart: rrr, S1, S2 normal; no S3 or S4; no murmurs or rubs  Abd: normal bowel sounds; soft, +suprapubic tender; negative McBurney's point tenderness, negative Mendieta's sign, no rebound, no guarding, spleen non-palpable; no hepatomegaly, no masses  Back: no midline spinal tenderness or stepoffs, +paraspinal ttp, no costovertebral angle tenderness  Extremities: no clubbing, cyanosis, or edema; no palpable deformities or fractures  Skin: good turgor; no rashes, petechiae, ecchymoses, or jaundice  Pulses: radial, posterior tibialis (PT), dorsalis pedis (DP) all 2+ & symmetric  Neuro: awake, alert, responsive; oriented to person, place and time; cranial nerves intact, EOMI, intact jaw movement, intact facial sensation, no facial asymmetry, hearing intact; Motor: Normal tone in upper and lower extremities bilaterally strength 5/5; Sensory: intact; normal steady gait;

## 2022-07-17 NOTE — ED ADULT NURSE NOTE - OBJECTIVE STATEMENT
pt is a 38 y/o female  with c/o lower  abdominal pain radiating to her back denies any nausea and vomiting.  also  w/  no c/o pain when she  urinates.

## 2022-07-17 NOTE — ED PROVIDER NOTE - PATIENT PORTAL LINK FT
You can access the FollowMyHealth Patient Portal offered by [facility] by registering at the following website: [email or web address]. By joining [email or web address]’s FollowMyHealth portal, you will also be able to view your health information using other applications (apps) compatible with our system.

## 2022-07-17 NOTE — ED PROVIDER NOTE - OBJECTIVE STATEMENT
37 year old female with PSHx of lumbar disc surgery and status post gastric sleeve presents to the ED with complaints of lower abdominal pain since Thursday. Patient reports that it is suprapubic radiating across entire lower abdomen to lower back, waxing and waning with some relief with meclozine. Patient further states her last dose of meclozine was this morning. Patient rates the severity of her pain a 8/10. Patient otherwise denies any fevers, chills, nausea, diarrhea and no sick contacts. 37 year old female with PSHx of lumbar disc surgery and status post gastric sleeve presents to the ED with complaints of lower abdominal pain since Thursday. Patient reports that it is suprapubic radiating across entire lower abdomen to lower back, waxing and waning with some relief with medication. Patient further states her last dose of it was this morning. Patient rates the severity of her pain a 8/10. Patient otherwise denies any fevers, chills, nausea, diarrhea and no sick contacts. Language Line  [id number]  37 year old female with PSHx of lumbar disc surgery and status post gastric sleeve presents to the ED with complaints of lower abdominal pain since Thursday. Patient reports that it is suprapubic radiating across entire lower abdomen to lower back, waxing and waning with some relief with medication. Patient further states her last dose of it was this morning. Patient rates the severity of her pain a 8/10. Patient otherwise denies any fevers, chills, nausea, diarrhea and no sick contacts.

## 2022-07-17 NOTE — ED ADULT TRIAGE NOTE - CHIEF COMPLAINT QUOTE
walked in with c/o  rt lower abdominal pain radiating to her back since thursday denies any nausea and vomiting

## 2022-07-17 NOTE — ED ADULT NURSE NOTE - NSIMPLEMENTINTERV_GEN_ALL_ED
Implemented All Universal Safety Interventions:  [place] to call system. Call bell, personal items and telephone within reach. Instruct patient to call for assistance. Room bathroom lighting operational. Non-slip footwear when patient is off stretcher. Physically safe environment: no spills, clutter or unnecessary equipment. Stretcher in lowest position, wheels locked, appropriate side rails in place.

## 2022-08-30 ENCOUNTER — EMERGENCY (EMERGENCY)
Facility: HOSPITAL | Age: 37
LOS: 1 days | Discharge: HOME | End: 2022-08-30
Attending: EMERGENCY MEDICINE
Payer: MEDICAID

## 2022-08-30 VITALS
HEART RATE: 55 BPM | OXYGEN SATURATION: 100 % | DIASTOLIC BLOOD PRESSURE: 67 MMHG | RESPIRATION RATE: 18 BRPM | SYSTOLIC BLOOD PRESSURE: 100 MMHG

## 2022-08-30 VITALS
WEIGHT: 149.91 LBS | SYSTOLIC BLOOD PRESSURE: 100 MMHG | DIASTOLIC BLOOD PRESSURE: 63 MMHG | OXYGEN SATURATION: 98 % | TEMPERATURE: 98 F | HEIGHT: 65 IN | RESPIRATION RATE: 18 BRPM | HEART RATE: 69 BPM

## 2022-08-30 DIAGNOSIS — Z98.890 OTHER SPECIFIED POSTPROCEDURAL STATES: Chronic | ICD-10-CM

## 2022-08-30 DIAGNOSIS — Z98.891 HISTORY OF UTERINE SCAR FROM PREVIOUS SURGERY: Chronic | ICD-10-CM

## 2022-08-30 LAB
ALBUMIN SERPL ELPH-MCNC: 4.1 G/DL — SIGNIFICANT CHANGE UP (ref 3.5–5)
ALP SERPL-CCNC: 44 U/L — SIGNIFICANT CHANGE UP (ref 40–120)
ALT FLD-CCNC: 20 U/L DA — SIGNIFICANT CHANGE UP (ref 10–60)
ANION GAP SERPL CALC-SCNC: 5 MMOL/L — SIGNIFICANT CHANGE UP (ref 5–17)
APPEARANCE UR: CLEAR — SIGNIFICANT CHANGE UP
AST SERPL-CCNC: 12 U/L — SIGNIFICANT CHANGE UP (ref 10–40)
BACTERIA # UR AUTO: ABNORMAL /HPF
BASOPHILS # BLD AUTO: 0.06 K/UL — SIGNIFICANT CHANGE UP (ref 0–0.2)
BASOPHILS NFR BLD AUTO: 0.4 % — SIGNIFICANT CHANGE UP (ref 0–2)
BILIRUB SERPL-MCNC: 1.5 MG/DL — HIGH (ref 0.2–1.2)
BILIRUB UR-MCNC: NEGATIVE — SIGNIFICANT CHANGE UP
BUN SERPL-MCNC: 13 MG/DL — SIGNIFICANT CHANGE UP (ref 7–18)
CALCIUM SERPL-MCNC: 8.9 MG/DL — SIGNIFICANT CHANGE UP (ref 8.4–10.5)
CHLORIDE SERPL-SCNC: 110 MMOL/L — HIGH (ref 96–108)
CO2 SERPL-SCNC: 26 MMOL/L — SIGNIFICANT CHANGE UP (ref 22–31)
COLOR SPEC: YELLOW — SIGNIFICANT CHANGE UP
CREAT SERPL-MCNC: 0.6 MG/DL — SIGNIFICANT CHANGE UP (ref 0.5–1.3)
DIFF PNL FLD: NEGATIVE — SIGNIFICANT CHANGE UP
EGFR: 117 ML/MIN/1.73M2 — SIGNIFICANT CHANGE UP
EOSINOPHIL # BLD AUTO: 0.31 K/UL — SIGNIFICANT CHANGE UP (ref 0–0.5)
EOSINOPHIL NFR BLD AUTO: 2.3 % — SIGNIFICANT CHANGE UP (ref 0–6)
EPI CELLS # UR: ABNORMAL /HPF
GLUCOSE SERPL-MCNC: 111 MG/DL — HIGH (ref 70–99)
GLUCOSE UR QL: NEGATIVE — SIGNIFICANT CHANGE UP
HCG SERPL-ACNC: <1 MIU/ML — SIGNIFICANT CHANGE UP
HCT VFR BLD CALC: 32.5 % — LOW (ref 34.5–45)
HGB BLD-MCNC: 11.6 G/DL — SIGNIFICANT CHANGE UP (ref 11.5–15.5)
IMM GRANULOCYTES NFR BLD AUTO: 0.4 % — SIGNIFICANT CHANGE UP (ref 0–1.5)
KETONES UR-MCNC: NEGATIVE — SIGNIFICANT CHANGE UP
LEUKOCYTE ESTERASE UR-ACNC: ABNORMAL
LIDOCAIN IGE QN: 337 U/L — SIGNIFICANT CHANGE UP (ref 73–393)
LYMPHOCYTES # BLD AUTO: 28.2 % — SIGNIFICANT CHANGE UP (ref 13–44)
LYMPHOCYTES # BLD AUTO: 3.77 K/UL — HIGH (ref 1–3.3)
MCHC RBC-ENTMCNC: 31.9 PG — SIGNIFICANT CHANGE UP (ref 27–34)
MCHC RBC-ENTMCNC: 35.7 GM/DL — SIGNIFICANT CHANGE UP (ref 32–36)
MCV RBC AUTO: 89.3 FL — SIGNIFICANT CHANGE UP (ref 80–100)
MONOCYTES # BLD AUTO: 1.03 K/UL — HIGH (ref 0–0.9)
MONOCYTES NFR BLD AUTO: 7.7 % — SIGNIFICANT CHANGE UP (ref 2–14)
NEUTROPHILS # BLD AUTO: 8.13 K/UL — HIGH (ref 1.8–7.4)
NEUTROPHILS NFR BLD AUTO: 61 % — SIGNIFICANT CHANGE UP (ref 43–77)
NITRITE UR-MCNC: NEGATIVE — SIGNIFICANT CHANGE UP
NRBC # BLD: 0 /100 WBCS — SIGNIFICANT CHANGE UP (ref 0–0)
PH UR: 6 — SIGNIFICANT CHANGE UP (ref 5–8)
PLATELET # BLD AUTO: 296 K/UL — SIGNIFICANT CHANGE UP (ref 150–400)
POTASSIUM SERPL-MCNC: 4.7 MMOL/L — SIGNIFICANT CHANGE UP (ref 3.5–5.3)
POTASSIUM SERPL-SCNC: 4.7 MMOL/L — SIGNIFICANT CHANGE UP (ref 3.5–5.3)
PROT SERPL-MCNC: 7.7 G/DL — SIGNIFICANT CHANGE UP (ref 6–8.3)
PROT UR-MCNC: NEGATIVE — SIGNIFICANT CHANGE UP
RBC # BLD: 3.64 M/UL — LOW (ref 3.8–5.2)
RBC # FLD: 15.9 % — HIGH (ref 10.3–14.5)
RBC CASTS # UR COMP ASSIST: SIGNIFICANT CHANGE UP /HPF (ref 0–2)
SARS-COV-2 RNA SPEC QL NAA+PROBE: SIGNIFICANT CHANGE UP
SODIUM SERPL-SCNC: 141 MMOL/L — SIGNIFICANT CHANGE UP (ref 135–145)
SP GR SPEC: 1.01 — SIGNIFICANT CHANGE UP (ref 1.01–1.02)
UROBILINOGEN FLD QL: NEGATIVE — SIGNIFICANT CHANGE UP
WBC # BLD: 13.35 K/UL — HIGH (ref 3.8–10.5)
WBC # FLD AUTO: 13.35 K/UL — HIGH (ref 3.8–10.5)
WBC UR QL: SIGNIFICANT CHANGE UP /HPF (ref 0–5)

## 2022-08-30 RX ORDER — CEFDINIR 250 MG/5ML
1 POWDER, FOR SUSPENSION ORAL
Qty: 14 | Refills: 0
Start: 2022-08-30 | End: 2022-09-05

## 2022-08-30 NOTE — ED PROVIDER NOTE - CLINICAL SUMMARY MEDICAL DECISION MAKING FREE TEXT BOX
Concern for pyelonephritis and renal colic. Will get basic blood work, labs, CT and reassess the patient.

## 2022-08-30 NOTE — ED ADULT NURSE NOTE - NSICDXPASTSURGICALHX_GEN_ALL_CORE_FT
PAST SURGICAL HISTORY:  H/O lumbar discectomy     H/O:      History of abdominoplasty     History of gastric surgery

## 2022-08-30 NOTE — ED ADULT NURSE NOTE - NSIMPLEMENTINTERV_GEN_ALL_ED
Implemented All Universal Safety Interventions:  [place] to call system. Call bell, personal items and telephone within reach. Instruct patient to call for assistance. Room bathroom lighting operational. Non-slip footwear when patient is off stretcher. Physically safe environment: no spills, clutter or unnecessary equipment. Stretcher in lowest position, wheels locked, appropriate side rails in place.

## 2022-08-30 NOTE — CONSULT NOTE ADULT - SUBJECTIVE AND OBJECTIVE BOX
Bariatric Surgery Consultation Note    Patient is a 37y old  Female who presents with a chief complaint of abdominal pain x 2 months    HPI:   # [id number]    38yo F h/o laparoscopic gastric bypass 2018 at [facility] presents c/o diffuse abdominal pain x 2 months.  Pt states pain radiates to her hips bilaterally.  Pt presented to ED because she felt bloated. She states she has 2-3 days of foul smelling urine. Also c/o blurry vision, dizziness, feeling tired.  Pt states she has been eating ok without nausea or vomiting. She denies fever or chills. She denies dysuria. Pt admits to having multiple surgeries in the past mainly from complications after her gastric bypass. She lost 90lbs since 2018. Last seen by GYN 2 months ago and reportedly negative workup. Denies history of kidney stones. Last BM yesterday and admits to passing flatus.     PAST MEDICAL & SURGICAL HISTORY:  Anemia      History of abdominoplasty      History of gastric surgery      H/O:       H/O lumbar discectomy          Allergies    No Known Allergies    Intolerances      Vital Signs Last 24 Hrs  T(C): 36.7 (30 Aug 2022 12:24), Max: 36.7 (30 Aug 2022 12:24)  T(F): 98 (30 Aug 2022 12:24), Max: 98 (30 Aug 2022 12:24)  HR: 55 (30 Aug 2022 21:17) (55 - 69)  BP: 100/67 (30 Aug 2022 21:17) (100/63 - 100/67)  BP(mean): --  RR: 18 (30 Aug 2022 21:17) (18 - 18)  SpO2: 100% (30 Aug 2022 21:17) (98% - 100%)    Parameters below as of 30 Aug 2022 21:17  Patient On (Oxygen Delivery Method): room air        Physical Exam:  Gen: awake, alert oriented NAD  HEENT: anicteric  Abd: nonspecific mild abdominal pain, soft, no guarding/rigidity, not distended or tympanic  Back: b/l CVA tenderness  Pelvic: no palpable inguinal masses  Ext: warm to touch no c/c/e    Labs:                          11.6   13.35 )-----------( 296      ( 30 Aug 2022 13:29 )             32.5     08-30    141  |  110<H>  |  13  ----------------------------<  111<H>  4.7   |  26  |  0.60    Ca    8.9      30 Aug 2022 13:29    TPro  7.7  /  Alb  4.1  /  TBili  1.5<H>  /  DBili  x   /  AST  12  /  ALT  20  /  AlkPhos  44  08-30      Urinalysis Basic - ( 30 Aug 2022 13:10 )    Color: Yellow / Appearance: Clear / S.010 / pH: x  Gluc: x / Ketone: Negative  / Bili: Negative / Urobili: Negative   Blood: x / Protein: Negative / Nitrite: Negative   Leuk Esterase: Small / RBC: 0-2 /HPF / WBC 3-5 /HPF   Sq Epi: x / Non Sq Epi: Moderate /HPF / Bacteria: Trace /HPF      Radiological Exams:  < from: CT Abdomen and Pelvis No Cont (22 @ 16:42) >  IMPRESSION:    Prominent right adnexa. Evaluation limited by adjacent unopacified small   bowel loops. Recommend pelvic ultrasound to exclude ovarian mass.    Cholelithiasis.    Mild constipation.      < end of copied text >

## 2022-08-30 NOTE — CONSULT NOTE ADULT - ASSESSMENT
36 yo F with h/o gastric bypass 2018 with nonspecific vague abdominal pain.   History not suggestive of internal hernia   Ovarian cyst on CT scan, no other pathology noted  ?UTI   1. No acute surgical intervention  2. Consider abx for possible UTI  3. Rec patient f/u with her surgeon and her GYN  4. D/w Dr. Perez and agreed

## 2022-08-30 NOTE — ED PROVIDER NOTE - OBJECTIVE STATEMENT
Used a translation phone for her.   37 year old female presents to the ED with complaints of 2 weeks of right back pain and 1 week of foul smelling urine. Also endorses urgency and frequency, as well as vague fatigue and dizziness. Denies dysuria, loss of control, fever, saddle paresthesia, headache, head trauma, or double vision, chest pain or shortness of breath. Describes the pain as radiating to the front. Reports she is able to ambulate.   NKDA.

## 2022-08-30 NOTE — ED PROVIDER NOTE - PATIENT PORTAL LINK FT
You can access the FollowMyHealth Patient Portal offered by [facility] by registering at the following website: [email or web address]. By joining [employer]’s FollowMyHealth portal, you will also be able to view your health information using other applications (apps) compatible with our system.

## 2022-08-30 NOTE — ED PROVIDER NOTE - CHPI ED SYMPTOMS NEG
no dysuria, loss of control, fever, saddle paresthesia, headache, head trauma, or double vision, chest pain or shortness of breath

## 2022-08-30 NOTE — ED PROVIDER NOTE - NSFOLLOWUPINSTRUCTIONS_ED_ALL_ED_FT
IMPORTANT INSTRUCTIONS FROM Dr. COCHRAN:    Please follow up with your personal medical doctor and OBGYN in 24-48 hours.   Bring results from today to your visit.    Antibiotics as prescribed.    If you were advised to take any medications - be sure to review the package insert.    If your symptoms change, get worse or if you have any new symptoms, come to the ER right away.  If you have any questions, call the ER at the phone number on this page.

## 2022-08-30 NOTE — ED PROVIDER NOTE - PROGRESS NOTE DETAILS
Discussed with Surgery. Given patient's gastric bypass history, they will see patient promptly. cleared by surg . feeling ok and wants to go home, reviewed case and results w pt, will treat w abx for cystitis, questions answered and pt understands, advised return precautions and care plan.

## 2022-10-27 NOTE — ED ADULT NURSE NOTE - HIV OFFER
[place] Border Text: The closure involved the vermilion border. Previously Declined (within the last year)

## 2022-11-21 ENCOUNTER — EMERGENCY (EMERGENCY)
Facility: HOSPITAL | Age: 37
LOS: 1 days | Discharge: LEFT BEFORE DISCHARGE | End: 2022-11-21
Attending: EMERGENCY MEDICINE

## 2022-11-21 VITALS
HEART RATE: 89 BPM | RESPIRATION RATE: 18 BRPM | WEIGHT: 149.91 LBS | OXYGEN SATURATION: 100 % | HEIGHT: 65 IN | TEMPERATURE: 98 F | SYSTOLIC BLOOD PRESSURE: 111 MMHG | DIASTOLIC BLOOD PRESSURE: 72 MMHG

## 2022-11-21 DIAGNOSIS — Z98.890 OTHER SPECIFIED POSTPROCEDURAL STATES: Chronic | ICD-10-CM

## 2022-11-21 DIAGNOSIS — Z98.891 HISTORY OF UTERINE SCAR FROM PREVIOUS SURGERY: Chronic | ICD-10-CM

## 2024-01-17 NOTE — ED PROVIDER NOTE - WET READ LAUNCH FT
From: Varun Disla  To: Dr. Mignon Lopez  Sent: 1/17/2024 9:05 AM EST  Subject: NEED A REFILL FOR PEN NEEDLES 32g 4mm    was removed from my medication list,    Thanks  
There are no Wet Read(s) to document.

## 2025-05-22 NOTE — DISCHARGE NOTE PROVIDER - CARE PROVIDERS DIRECT ADDRESSES
Cathy Laguna  (1960)    2025    Subjective:     Cathy Laguna is 64 y.o. female who complains today of:    Chief Complaint   Patient presents with    Follow-up    Back Pain    Arthritis    Hip Pain     Left    Knee Pain     Bilateral    Arm Pain     Left         Allergies:  Patient has no known allergies.    Past Medical History:   Diagnosis Date    Chronic pain     Osteoarthritis      Past Surgical History:   Procedure Laterality Date    BACK SURGERY       SECTION      KNEE SURGERY Right     SPINE SURGERY       Family History   Problem Relation Age of Onset    Right Breast Cancer Mother     Breast Cancer Mother 55    No Known Problems Father     No Known Problems Sister     No Known Problems Brother     No Known Problems Maternal Grandmother     No Known Problems Maternal Grandfather     No Known Problems Paternal Grandmother     No Known Problems Paternal Grandfather     Breast Cancer Maternal Cousin 40     Social History     Socioeconomic History    Marital status:      Spouse name: Not on file    Number of children: Not on file    Years of education: Not on file    Highest education level: Not on file   Occupational History    Not on file   Tobacco Use    Smoking status: Former     Current packs/day: 0.00     Average packs/day: 1 pack/day for 0.2 years (0.2 ttl pk-yrs)     Types: Cigarettes     Start date:      Quit date: 3/15/2019     Years since quittin.1     Passive exposure: Past    Smokeless tobacco: Never   Vaping Use    Vaping status: Never Used   Substance and Sexual Activity    Alcohol use: Never    Drug use: Never    Sexual activity: Yes     Partners: Male   Other Topics Concern    Not on file   Social History Narrative    Not on file     Social Drivers of Health     Financial Resource Strain: Low Risk  (10/26/2023)    Overall Financial Resource Strain (CARDIA)     Difficulty of Paying Living Expenses: Not hard at all   Food Insecurity: No Food  [email or web address]